# Patient Record
Sex: MALE | Race: WHITE | NOT HISPANIC OR LATINO | Employment: OTHER | URBAN - METROPOLITAN AREA
[De-identification: names, ages, dates, MRNs, and addresses within clinical notes are randomized per-mention and may not be internally consistent; named-entity substitution may affect disease eponyms.]

---

## 2017-07-11 RX ORDER — SIMVASTATIN 40 MG
TABLET ORAL
Qty: 90 TABLET | Refills: 1 | Status: SHIPPED | OUTPATIENT
Start: 2017-07-11 | End: 2017-11-13 | Stop reason: SDUPTHER

## 2017-07-11 RX ORDER — RAMIPRIL 5 MG/1
CAPSULE ORAL
Qty: 90 CAPSULE | Refills: 1 | Status: SHIPPED | OUTPATIENT
Start: 2017-07-11 | End: 2017-11-13 | Stop reason: SDUPTHER

## 2017-11-13 ENCOUNTER — OFFICE VISIT (OUTPATIENT)
Dept: CARDIOLOGY | Facility: CLINIC | Age: 82
End: 2017-11-13

## 2017-11-13 VITALS
HEIGHT: 69 IN | BODY MASS INDEX: 23.43 KG/M2 | SYSTOLIC BLOOD PRESSURE: 130 MMHG | DIASTOLIC BLOOD PRESSURE: 70 MMHG | WEIGHT: 158.2 LBS | HEART RATE: 60 BPM | OXYGEN SATURATION: 97 %

## 2017-11-13 DIAGNOSIS — I25.10 CORONARY ARTERY DISEASE INVOLVING NATIVE CORONARY ARTERY OF NATIVE HEART WITHOUT ANGINA PECTORIS: Primary | ICD-10-CM

## 2017-11-13 DIAGNOSIS — E78.2 MIXED HYPERLIPIDEMIA: ICD-10-CM

## 2017-11-13 DIAGNOSIS — I10 ESSENTIAL HYPERTENSION: ICD-10-CM

## 2017-11-13 PROCEDURE — 99213 OFFICE O/P EST LOW 20 MIN: CPT | Performed by: INTERNAL MEDICINE

## 2017-11-13 RX ORDER — TIMOLOL 5 MG/ML
SOLUTION/ DROPS OPHTHALMIC DAILY
Status: CANCELLED | OUTPATIENT
Start: 2017-11-13

## 2017-11-13 RX ORDER — RAMIPRIL 5 MG/1
5 CAPSULE ORAL DAILY
Qty: 90 CAPSULE | Refills: 4 | Status: SHIPPED | OUTPATIENT
Start: 2017-11-13 | End: 2018-11-19 | Stop reason: SDUPTHER

## 2017-11-13 RX ORDER — ASPIRIN 81 MG/1
81 TABLET ORAL DAILY
Qty: 90 TABLET | Refills: 3 | Status: CANCELLED | OUTPATIENT
Start: 2017-11-13

## 2017-11-13 RX ORDER — SIMVASTATIN 40 MG
40 TABLET ORAL NIGHTLY
Qty: 90 TABLET | Refills: 4 | Status: SHIPPED | OUTPATIENT
Start: 2017-11-13 | End: 2018-11-19 | Stop reason: SDUPTHER

## 2017-11-13 RX ORDER — HYDROCHLOROTHIAZIDE 25 MG/1
25 TABLET ORAL DAILY
Qty: 90 TABLET | Refills: 3 | Status: SHIPPED | OUTPATIENT
Start: 2017-11-13 | End: 2018-08-20 | Stop reason: SDUPTHER

## 2017-11-13 NOTE — PROGRESS NOTES
Subjective:     Encounter Date:11/13/2017      Patient ID: Konstantin Limon is a 82 y.o. male.    Chief Complaint: Coronary Artery Disease; Hypertension; and Hyperlipidemia    PROBLEM LIST:    1. Coronary artery disease:  a. Status post-acute coronary syndrome, 2006, with cardiac catheterization revealing 95% mid LAD, which  was stented with a 2.75 x 18 mm Taxus.  Also, showed 60% first diagonal.  EF 50% with apex being hypokinetic.  b. Myocardial Stress test 1/8/2015: Normal MPS  2. Hypertension.  3. Hyperlipidemia.  4. History of tobacco abuse.  5. Prostate cancer.  6. Glaucoma.  7. GERD/PUD.  8. History of CVA.  9. Surgical history:    a. TURP.  b. Mastoid surgery.  c. Appendectomy.  d. Recurrent basal cell carcinoma, status post radical  neck/ear surgery with skin flap, 2010.  e. Mohs procedure    History of Present Illness  Konstantin Limon returns today for a 12 month follow up with a history of coronary artery disease, hypertension and hyperlipidemia. Since last visit he has been doing well from a cardiovascular standpoint. Otherwise, he has had a recent surgery to remove cancer from his ear. Denies any exertional chest pain, shortness of breath, orthopnea, PND, or palpitations. To remain active, Mr. Limon does work around the house and yardwork. He also walks 2 miles per day.     Allergies   Allergen Reactions   • Niaspan [Niacin Er]      Flushing       Current Outpatient Prescriptions:   •  aspirin 81 MG EC tablet, Take 1 tablet by mouth Daily., Disp: 90 tablet, Rfl: 3  •  cetirizine (ZYRTEC ALLERGY) 10 MG tablet, Take 10 mg by mouth Daily., Disp: , Rfl:   •  cholecalciferol (VITAMIN D3) 1000 UNITS tablet, Take 1,000 Units by mouth Daily., Disp: , Rfl:   •  folic acid (FOLVITE) 400 MCG tablet, Take 400 mcg by mouth Daily., Disp: , Rfl:   •  hydrochlorothiazide (HYDRODIURIL) 25 MG tablet, Take 1 tablet by mouth Daily., Disp: 90 tablet, Rfl: 3  •  latanoprost (XALATAN) 0.005 % ophthalmic solution, 1  drop Every Night., Disp: , Rfl:   •  metoprolol tartrate (LOPRESSOR) 25 MG tablet, Take 0.5 tablets by mouth Every Night., Disp: 45 tablet, Rfl: 3  •  Multiple Vitamins-Minerals (MULTIVITAMIN ADULTS 50+ PO), Take  by mouth Daily., Disp: , Rfl:   •  omeprazole (priLOSEC) 20 MG capsule, Take 20 mg by mouth Daily., Disp: , Rfl:   •  ramipril (ALTACE) 5 MG capsule, TAKE 1 CAPSULE EVERY MORNING DAILY, Disp: 90 capsule, Rfl: 1  •  simvastatin (ZOCOR) 40 MG tablet, TAKE 1 TABLET EVERY DAY, Disp: 90 tablet, Rfl: 1  •  Timolol Maleate PF 0.5 % solution, Apply  to eye Daily., Disp: , Rfl:   •  vitamin B-12 (CYANOCOBALAMIN) 1000 MCG tablet, Take 1,000 mcg by mouth Daily., Disp: , Rfl:     The following portions of the patient's history were reviewed and updated as appropriate: allergies, current medications, past family history, past medical history, past social history, past surgical history and problem list.    Review of Systems   Constitution: Negative.   Cardiovascular: Negative.    Respiratory: Negative.    Hematologic/Lymphatic: Negative for bleeding problem. Does not bruise/bleed easily.   Skin: Negative for rash.   Musculoskeletal: Negative for muscle weakness and myalgias.   Gastrointestinal: Negative for heartburn, nausea and vomiting.   Neurological: Negative.         Objective:   There were no vitals filed for this visit.      Physical Exam   Constitutional: He is oriented to person, place, and time. He appears well-developed and well-nourished.   HENT:   Mouth/Throat: Oropharynx is clear and moist.   Neck: No JVD present. Carotid bruit is not present. No thyromegaly present.   Cardiovascular: Regular rhythm, S1 normal, S2 normal, normal heart sounds and intact distal pulses.  Exam reveals no gallop, no S3 and no S4.    No murmur heard.  Pulses:       Carotid pulses are 2+ on the right side, and 2+ on the left side.       Radial pulses are 2+ on the right side, and 2+ on the left side.   Pulmonary/Chest: Breath  sounds normal.   Abdominal: Soft. Bowel sounds are normal. He exhibits no mass. There is no tenderness.   Musculoskeletal: He exhibits no edema.   Neurological: He is alert and oriented to person, place, and time.   Skin: Skin is warm and dry. No rash noted.     Lab Review:  Labs 2017:   LDL 69  Creatinine 0.9    Procedures        Assessment:   Konstantin was seen today for coronary artery disease, hypertension and hyperlipidemia.    Diagnoses and all orders for this visit:    Coronary artery disease involving native coronary artery of native heart without angina pectoris    Essential hypertension    Mixed hyperlipidemia    Impression:  1. Coronary artery disease, stable no angina.  2. Hypertension controlled  3. Dyslipidemia well-controlled last LDL 69    Plan:  1. Continue current medications.  2. Revisit in 12 MO, or sooner as needed.    Scribed for Andrae King MD by Sis Wagner. 11/13/2017  9:23 AM    I, Andrae King have reviewed the note in full and agree with all aspects of the above including physical exam, assessment, labs and plan with changes made accordingly.     Andrae King MD  11/13/17  9:43 AM          Please note that portions of this note may have been completed with a voice recognition program. Efforts were made to edit the dictations, but occasionally words are mistranscribed.

## 2017-11-15 RX ORDER — OMEPRAZOLE 20 MG/1
20 CAPSULE, DELAYED RELEASE ORAL DAILY
Qty: 90 CAPSULE | Refills: 0 | Status: SHIPPED | OUTPATIENT
Start: 2017-11-15 | End: 2018-01-18 | Stop reason: SDUPTHER

## 2018-01-18 RX ORDER — OMEPRAZOLE 20 MG/1
CAPSULE, DELAYED RELEASE ORAL
Qty: 90 CAPSULE | Refills: 0 | Status: SHIPPED | OUTPATIENT
Start: 2018-01-18

## 2018-08-20 RX ORDER — HYDROCHLOROTHIAZIDE 25 MG/1
TABLET ORAL
Qty: 90 TABLET | Refills: 1 | Status: SHIPPED | OUTPATIENT
Start: 2018-08-20 | End: 2018-11-19 | Stop reason: SDUPTHER

## 2018-11-19 ENCOUNTER — OFFICE VISIT (OUTPATIENT)
Dept: CARDIOLOGY | Facility: CLINIC | Age: 83
End: 2018-11-19

## 2018-11-19 VITALS
HEART RATE: 59 BPM | BODY MASS INDEX: 23.28 KG/M2 | OXYGEN SATURATION: 96 % | HEIGHT: 69 IN | SYSTOLIC BLOOD PRESSURE: 130 MMHG | DIASTOLIC BLOOD PRESSURE: 74 MMHG | WEIGHT: 157.2 LBS

## 2018-11-19 DIAGNOSIS — I25.10 CORONARY ARTERY DISEASE INVOLVING NATIVE CORONARY ARTERY OF NATIVE HEART WITHOUT ANGINA PECTORIS: Primary | ICD-10-CM

## 2018-11-19 DIAGNOSIS — E78.2 MIXED HYPERLIPIDEMIA: ICD-10-CM

## 2018-11-19 DIAGNOSIS — I10 ESSENTIAL HYPERTENSION: ICD-10-CM

## 2018-11-19 PROCEDURE — 99213 OFFICE O/P EST LOW 20 MIN: CPT | Performed by: INTERNAL MEDICINE

## 2018-11-19 RX ORDER — RAMIPRIL 5 MG/1
5 CAPSULE ORAL DAILY
Qty: 90 CAPSULE | Refills: 4 | Status: SHIPPED | OUTPATIENT
Start: 2018-11-19 | End: 2020-01-06 | Stop reason: SDUPTHER

## 2018-11-19 RX ORDER — SIMVASTATIN 40 MG
40 TABLET ORAL NIGHTLY
Qty: 90 TABLET | Refills: 4 | Status: SHIPPED | OUTPATIENT
Start: 2018-11-19 | End: 2020-01-06 | Stop reason: SDUPTHER

## 2018-11-19 RX ORDER — HYDROCHLOROTHIAZIDE 25 MG/1
25 TABLET ORAL DAILY
Qty: 90 TABLET | Refills: 3 | Status: SHIPPED | OUTPATIENT
Start: 2018-11-19 | End: 2019-08-26 | Stop reason: SDUPTHER

## 2018-11-19 NOTE — PROGRESS NOTES
Subjective:     Encounter Date:11/19/2018      Patient ID: Konstantin Limon is a 83 y.o. male.    Chief Complaint: Coronary Artery Disease    PROBLEM LIST:  1. Coronary artery disease:  a. Status post-acute coronary syndrome, 2006, with cardiac catheterization revealing 95% mid LAD, which  was stented with a 2.75 x 18 mm Taxus.  Also, showed 60% first diagonal.  EF 50% with apex being hypokinetic.  b. Myocardial Stress test 1/8/2015: Normal MPS  2. Hypertension.  3. Hyperlipidemia.  4. History of tobacco abuse.  5. Prostate cancer.  6. Glaucoma.  7. GERD/PUD.  8. History of CVA.  9. Surgical history:    a. TURP.  b. Mastoid surgery.  c. Appendectomy.  d. Recurrent basal cell carcinoma, status post radical  neck/ear surgery with skin flap, 2010.  e. Mohs procedure    History of Present Illness  Konstantin Limon returns today for a 12 month follow up with a history of coronary artery disease, hypertension, and hyperlipidemia. Since last visit he has been doing well from a cardiovascular standpoint. [facility] presented with a few questions. Inquired whether aspirin and cholesterol should be taken during the day or at night. Denies any exertional chest pain, shortness of breath, orthopnea, PND, or palpitations. Mr. Limon remains active by walking and doing yardwork. Otherwise, he has been struggling with skin cancer.    Allergies   Allergen Reactions   • Niaspan [Niacin Er]      Flushing       Current Outpatient Medications:   •  aspirin 81 MG EC tablet, Take 1 tablet by mouth Daily., Disp: 90 tablet, Rfl: 3  •  B Complex Vitamins (B COMPLEX-B12 PO), Take 1 tablet by mouth Daily., Disp: , Rfl:   •  cetirizine (ZYRTEC ALLERGY) 10 MG tablet, Take 10 mg by mouth Daily., Disp: , Rfl:   •  cholecalciferol (VITAMIN D3) 1000 UNITS tablet, Take 1,000 Units by mouth Daily., Disp: , Rfl:   •  hydrochlorothiazide (HYDRODIURIL) 25 MG tablet, TAKE 1 TABLET EVERY DAY, Disp: 90 tablet, Rfl: 1  •  latanoprost (XALATAN)  "0.005 % ophthalmic solution, Administer 1 drop to both eyes Every Night., Disp: , Rfl:   •  Multiple Vitamins-Minerals (MULTIVITAMIN ADULTS 50+ PO), Take 1 tablet by mouth Daily., Disp: , Rfl:   •  omeprazole (priLOSEC) 20 MG capsule, TAKE 1 CAPSULE EVERY DAY, Disp: 90 capsule, Rfl: 0  •  ramipril (ALTACE) 5 MG capsule, Take 1 capsule by mouth Daily., Disp: 90 capsule, Rfl: 4  •  simvastatin (ZOCOR) 40 MG tablet, Take 1 tablet by mouth Every Night., Disp: 90 tablet, Rfl: 4  •  Timolol Maleate PF 0.5 % solution, Administer 1 drop to both eyes Daily., Disp: , Rfl:     The following portions of the patient's history were reviewed and updated as appropriate: allergies, current medications, past family history, past medical history, past social history, past surgical history and problem list.    Review of Systems   Constitution: Negative. Negative for weakness and malaise/fatigue.   Cardiovascular: Negative.  Negative for chest pain, leg swelling, near-syncope, orthopnea, palpitations, paroxysmal nocturnal dyspnea and syncope.   Respiratory: Negative.  Negative for cough, shortness of breath and wheezing.    Hematologic/Lymphatic: Negative for bleeding problem. Does not bruise/bleed easily.   Skin: Negative for rash.   Musculoskeletal: Negative for joint swelling, muscle weakness and myalgias.   Gastrointestinal: Negative for heartburn, nausea and vomiting.   Neurological: Negative.  Negative for headaches, light-headedness, loss of balance and tremors.   Psychiatric/Behavioral: The patient is not nervous/anxious.         Objective:     Vitals:    11/19/18 0913   BP: 130/74   BP Location: Left arm   Patient Position: Sitting   Pulse: 59   SpO2: 96%   Weight: 71.3 kg (157 lb 3.2 oz)   Height: 174 cm (68.5\")       Physical Exam   Constitutional: He is oriented to person, place, and time. He appears well-developed and well-nourished.   HENT:   Mouth/Throat: Oropharynx is clear and moist.   Neck: No JVD present. Carotid " bruit is not present. No thyromegaly present.   Cardiovascular: Regular rhythm, S1 normal, S2 normal, normal heart sounds and intact distal pulses. Exam reveals no gallop, no S3 and no S4.   No murmur heard.  Pulses:       Carotid pulses are 2+ on the right side, and 2+ on the left side.       Radial pulses are 2+ on the right side, and 2+ on the left side.   Pulmonary/Chest: Breath sounds normal.   Abdominal: Soft. Bowel sounds are normal. He exhibits no mass. There is no tenderness.   Musculoskeletal: He exhibits no edema.   Neurological: He is alert and oriented to person, place, and time.   Skin: Skin is warm and dry. No rash noted.     Lab Review:    September 5, 2018  LDL 71  HDL Cholesterol 26   A1c 6  Creatinine 0.84    Procedures        Assessment:   Konstantin was seen today for coronary artery disease.    Diagnoses and all orders for this visit:    Coronary artery disease involving native coronary artery of native heart without angina pectoris    Essential hypertension    Mixed hyperlipidemia    Impression:  1. Coronary artery disease, stable without angina.  2. Hypertension is well-controlled.  3. Hyperlipidemia is controlled with statin therapy.    Plan:  1. Continue current medications.  2. Revisit in 12 MO, or sooner as needed.    Scribed for Andrae King MD by Sis Wagner. 11/19/2018  9:47 AM    I, Andrae King MD, personally performed the services described in this documentation as scribed by the above individual in my presence, and it is both accurate and complete      Please note that portions of this note may have been completed with a voice recognition program. Efforts were made to edit the dictations, but occasionally words are mistranscribed.

## 2019-08-26 RX ORDER — HYDROCHLOROTHIAZIDE 25 MG/1
TABLET ORAL
Qty: 90 TABLET | Refills: 3 | Status: SHIPPED | OUTPATIENT
Start: 2019-08-26 | End: 2020-01-06 | Stop reason: SDUPTHER

## 2020-01-06 ENCOUNTER — OFFICE VISIT (OUTPATIENT)
Dept: CARDIOLOGY | Facility: CLINIC | Age: 85
End: 2020-01-06

## 2020-01-06 VITALS
HEART RATE: 58 BPM | BODY MASS INDEX: 23.25 KG/M2 | WEIGHT: 157 LBS | OXYGEN SATURATION: 97 % | SYSTOLIC BLOOD PRESSURE: 132 MMHG | HEIGHT: 69 IN | DIASTOLIC BLOOD PRESSURE: 76 MMHG

## 2020-01-06 DIAGNOSIS — I10 ESSENTIAL HYPERTENSION: ICD-10-CM

## 2020-01-06 DIAGNOSIS — E78.2 MIXED HYPERLIPIDEMIA: ICD-10-CM

## 2020-01-06 DIAGNOSIS — I25.10 CORONARY ARTERY DISEASE INVOLVING NATIVE CORONARY ARTERY OF NATIVE HEART WITHOUT ANGINA PECTORIS: Primary | ICD-10-CM

## 2020-01-06 PROCEDURE — 99213 OFFICE O/P EST LOW 20 MIN: CPT | Performed by: INTERNAL MEDICINE

## 2020-01-06 RX ORDER — SIMVASTATIN 40 MG
40 TABLET ORAL NIGHTLY
Qty: 90 TABLET | Refills: 4 | Status: SHIPPED | OUTPATIENT
Start: 2020-01-06 | End: 2021-01-11 | Stop reason: SDUPTHER

## 2020-01-06 RX ORDER — HYDROCHLOROTHIAZIDE 25 MG/1
25 TABLET ORAL DAILY
Qty: 90 TABLET | Refills: 3 | Status: SHIPPED | OUTPATIENT
Start: 2020-01-06 | End: 2021-01-11 | Stop reason: SDUPTHER

## 2020-01-06 RX ORDER — RAMIPRIL 5 MG/1
5 CAPSULE ORAL DAILY
Qty: 90 CAPSULE | Refills: 4 | Status: SHIPPED | OUTPATIENT
Start: 2020-01-06 | End: 2021-01-11 | Stop reason: SDUPTHER

## 2020-01-06 NOTE — PROGRESS NOTES
Subjective:     Encounter Date:01/06/2020      Patient ID: Konstantin Limon is a 84 y.o. male.    Chief Complaint: Coronary Artery Disease      PROBLEM LIST:    1. Coronary artery disease:  a. Status post-acute coronary syndrome, 2006, with cardiac catheterization revealing 95% mid LAD, which  was stented with a 2.75 x 18 mm Taxus.  Also, showed 60% first diagonal.  EF 50% with apex being hypokinetic.  b. Myocardial Stress test 1/8/2015: Normal MPS  2. Hypertension.  3. Hyperlipidemia.  4. History of tobacco abuse.  5. Prostate cancer.  6. Glaucoma.  7. GERD/PUD.  8. History of CVA.  9. Surgical history:    a. TURP.  b. Mastoid surgery.  c. Appendectomy.  d. Recurrent basal cell carcinoma, status post radical  neck/ear surgery with skin flap, 2010.  e. Mohs procedure    History of Present Illness  Konstantin Limon returns today for an annual follow up with a history of coronary artery disease, hypertension, and hyperlipidemia. Since last visit he was diagnosed with skin cancer and had to receive fifteen radiation treatments. He is otherwise doing well from a cardiac standpoint. He remains active by walking about three miles everyday. Denies any exertional chest pain, shortness of breath, orthopnea, PND, or palpitations.    Allergies   Allergen Reactions   • Niaspan [Niacin Er]      Flushing         Current Outpatient Medications:   •  aspirin 81 MG EC tablet, Take 1 tablet by mouth Daily., Disp: 90 tablet, Rfl: 3  •  B Complex Vitamins (B COMPLEX-B12 PO), Take 1 tablet by mouth Daily., Disp: , Rfl:   •  cetirizine (ZYRTEC ALLERGY) 10 MG tablet, Take 10 mg by mouth Daily., Disp: , Rfl:   •  cholecalciferol (VITAMIN D3) 1000 UNITS tablet, Take 1,000 Units by mouth Daily., Disp: , Rfl:   •  hydrochlorothiazide (HYDRODIURIL) 25 MG tablet, TAKE 1 TABLET EVERY DAY, Disp: 90 tablet, Rfl: 3  •  latanoprost (XALATAN) 0.005 % ophthalmic solution, Administer 1 drop to both eyes Every Night., Disp: , Rfl:   •  Multiple  "Vitamins-Minerals (MULTIVITAMIN ADULTS 50+ PO), Take 1 tablet by mouth Daily., Disp: , Rfl:   •  omeprazole (priLOSEC) 20 MG capsule, TAKE 1 CAPSULE EVERY DAY, Disp: 90 capsule, Rfl: 0  •  ramipril (ALTACE) 5 MG capsule, Take 1 capsule by mouth Daily., Disp: 90 capsule, Rfl: 4  •  simvastatin (ZOCOR) 40 MG tablet, Take 1 tablet by mouth Every Night., Disp: 90 tablet, Rfl: 4  •  Timolol Maleate PF 0.5 % solution, Administer 1 drop to both eyes Daily., Disp: , Rfl:     The following portions of the patient's history were reviewed and updated as appropriate: allergies, current medications, past family history, past medical history, past social history, past surgical history and problem list.    Review of Systems   Constitution: Negative.   Cardiovascular: Negative.  Negative for chest pain, cyanosis, dyspnea on exertion, irregular heartbeat, leg swelling, orthopnea, palpitations and paroxysmal nocturnal dyspnea.   Respiratory: Negative.  Negative for shortness of breath.    Hematologic/Lymphatic: Negative for bleeding problem. Does not bruise/bleed easily.   Skin: Positive for skin cancer. Negative for rash.   Musculoskeletal: Negative for muscle weakness and myalgias.   Gastrointestinal: Negative for heartburn, nausea and vomiting.   Neurological: Negative.           Objective:     Vitals:    01/06/20 1001   BP: 132/76   BP Location: Right arm   Patient Position: Sitting   Pulse: 58   SpO2: 97%   Weight: 71.2 kg (157 lb)   Height: 175.3 cm (69\")         Physical Exam   Constitutional: He is oriented to person, place, and time. He appears well-developed and well-nourished.   HENT:   Mouth/Throat: Oropharynx is clear and moist.   Neck: No JVD present. Carotid bruit is not present. No thyromegaly present.   Cardiovascular: Regular rhythm, S1 normal, S2 normal, normal heart sounds and intact distal pulses. Exam reveals no gallop, no S3 and no S4.   No murmur heard.  Pulses:       Carotid pulses are 2+ on the right side, " and 2+ on the left side.       Radial pulses are 2+ on the right side, and 2+ on the left side.   Pulmonary/Chest: Breath sounds normal.   Abdominal: Soft. Bowel sounds are normal. He exhibits no mass. There is no tenderness.   Musculoskeletal: He exhibits no edema.   Neurological: He is alert and oriented to person, place, and time.   Skin: Skin is warm and dry. No rash noted.       Lab Review:    Last lipid Panel:  08/28/2019      TRIG 141  HDL 33  LDL 79    Procedures        Assessment:   Konstantin was seen today for coronary artery disease.    Diagnoses and all orders for this visit:    Coronary artery disease involving native coronary artery of native heart without angina pectoris    Essential hypertension    Mixed hyperlipidemia        Impression:  1. Coronary artery disease, stable and without angina. Pt is on aspirin 81 mg for anticoagulation.   2. Hypertension, well controlled on ramipril 5 mg.   3. Hyperlipidemia, well controlled on simvastatin 40 mg.     Plan:  1. Stable cardiac status.   2. Continue current medications.  3. Revisit in 12 MO, or sooner as needed.    Scribed for Andrae King MD by Farhana Brumfield. 1/6/2020  10:28 AM    Andrae King MD      Please note that portions of this note may have been completed with a voice recognition program. Efforts were made to edit the dictations, but occasionally words are mistranscribed.

## 2021-01-11 ENCOUNTER — OFFICE VISIT (OUTPATIENT)
Dept: CARDIOLOGY | Facility: CLINIC | Age: 86
End: 2021-01-11

## 2021-01-11 VITALS
OXYGEN SATURATION: 96 % | BODY MASS INDEX: 24.4 KG/M2 | HEART RATE: 58 BPM | SYSTOLIC BLOOD PRESSURE: 132 MMHG | HEIGHT: 68 IN | RESPIRATION RATE: 20 BRPM | DIASTOLIC BLOOD PRESSURE: 66 MMHG | WEIGHT: 161 LBS

## 2021-01-11 DIAGNOSIS — E78.2 MIXED HYPERLIPIDEMIA: ICD-10-CM

## 2021-01-11 DIAGNOSIS — I10 ESSENTIAL HYPERTENSION: ICD-10-CM

## 2021-01-11 DIAGNOSIS — I25.10 CORONARY ARTERY DISEASE INVOLVING NATIVE CORONARY ARTERY OF NATIVE HEART WITHOUT ANGINA PECTORIS: Primary | ICD-10-CM

## 2021-01-11 PROCEDURE — 99214 OFFICE O/P EST MOD 30 MIN: CPT | Performed by: INTERNAL MEDICINE

## 2021-01-11 RX ORDER — HYDROCHLOROTHIAZIDE 25 MG/1
25 TABLET ORAL DAILY
Qty: 90 TABLET | Refills: 3 | Status: SHIPPED | OUTPATIENT
Start: 2021-01-11 | End: 2022-01-17 | Stop reason: SDUPTHER

## 2021-01-11 RX ORDER — RAMIPRIL 5 MG/1
5 CAPSULE ORAL DAILY
Qty: 90 CAPSULE | Refills: 4 | Status: SHIPPED | OUTPATIENT
Start: 2021-01-11 | End: 2022-01-17 | Stop reason: SDUPTHER

## 2021-01-11 RX ORDER — SIMVASTATIN 40 MG
40 TABLET ORAL NIGHTLY
Qty: 90 TABLET | Refills: 4 | Status: SHIPPED | OUTPATIENT
Start: 2021-01-11 | End: 2022-01-17 | Stop reason: SDUPTHER

## 2021-01-11 NOTE — PROGRESS NOTES
[facility] Cardiology  Subjective:     Encounter Date: 01/11/2021      Patient ID: Konstantin Limon is a 85 y.o. male.    Chief Complaint: Coronary Artery Disease      PROBLEM LIST:  1. Coronary artery disease:  a. Status post-acute coronary syndrome, 2006, with cardiac catheterization revealing 95% mid LAD, which  was stented with a 2.75 x 18 mm Taxus.  Also, showed 60% first diagonal.  EF 50% with apex being hypokinetic.  b. Myocardial Stress test 1/8/2015: Normal MPS  2. Hypertension.  3. Hyperlipidemia.  4. History of tobacco abuse.  5. Prostate cancer.  6. Recurrence head neck squamous cell CA  a. Status post radical neck/ear surgery with skin flap 2020  7. Glaucoma.  8. GERD/PUD.  9. History of CVA.  10. Surgical history:    a. TURP.  b. Mastoid surgery.  c. Appendectomy.  d. Recurrent basal cell carcinoma, status post radical  neck/ear surgery with skin flap, 2010.  e. Mohs procedure      History of Present Illness  Konstantin Limon returns today for 12-month follow up with a history of coronary artery disease and cardiac risk factors. Since last visit, he has been feeling well overall from a cardiovascular standpoint. He has not had any cardiopulmonary limitations with activity and his wife notes she even has to stop him from going outside to walk too frequently. Patient denies chest pain, shortness of breath, palpitations, edema, dizziness, and syncope. Patient had left facial surgery for squamous cell cancer in August 2020. He did not have radiation therapy following the surgery. He had a CT scan last week which showed he is in remission.     Allergies   Allergen Reactions   • Niaspan [Niacin Er]      Flushing         Current Outpatient Medications:   •  aspirin 81 MG EC tablet, Take 1 tablet by mouth Daily., Disp: 90 tablet, Rfl: 3  •  B Complex Vitamins (B COMPLEX-B12 PO), Take 1 tablet by mouth Daily., Disp: , Rfl:   •  cetirizine (ZYRTEC ALLERGY) 10 MG tablet, Take 10 mg by  "mouth Daily., Disp: , Rfl:   •  cholecalciferol (VITAMIN D3) 1000 UNITS tablet, Take 1,000 Units by mouth Daily., Disp: , Rfl:   •  hydroCHLOROthiazide (HYDRODIURIL) 25 MG tablet, Take 1 tablet by mouth Daily., Disp: 90 tablet, Rfl: 3  •  latanoprost (XALATAN) 0.005 % ophthalmic solution, Administer 1 drop to both eyes Every Night., Disp: , Rfl:   •  Multiple Vitamins-Minerals (MULTIVITAMIN ADULTS 50+ PO), Take 1 tablet by mouth Daily., Disp: , Rfl:   •  omeprazole (priLOSEC) 20 MG capsule, TAKE 1 CAPSULE EVERY DAY, Disp: 90 capsule, Rfl: 0  •  ramipril (ALTACE) 5 MG capsule, Take 1 capsule by mouth Daily., Disp: 90 capsule, Rfl: 4  •  simvastatin (ZOCOR) 40 MG tablet, Take 1 tablet by mouth Every Night., Disp: 90 tablet, Rfl: 4  •  Timolol Maleate PF 0.5 % solution, Administer 1 drop to both eyes Daily., Disp: , Rfl:     The following portions of the patient's history were reviewed and updated as appropriate: allergies, current medications, past family history, past medical history, past social history, past surgical history and problem list.    Review of Systems   Constitution: Negative.   Cardiovascular: Negative for chest pain, dyspnea on exertion, leg swelling, palpitations and syncope.   Respiratory: Negative.  Negative for shortness of breath.    Hematologic/Lymphatic: Negative for bleeding problem. Does not bruise/bleed easily.   Skin: Negative for rash.   Musculoskeletal: Negative for muscle weakness and myalgias.   Gastrointestinal: Negative for heartburn, nausea and vomiting.   Neurological: Negative for dizziness, light-headedness, loss of balance and numbness.          Objective:     Vitals:    01/11/21 0946   BP: 132/66   BP Location: Right arm   Pulse: 58   Resp: 20   SpO2: 96%   Weight: 73 kg (161 lb)   Height: 172.7 cm (68\")         Vitals signs reviewed.   Constitutional:       Appearance: Well-developed.   Neck:      Thyroid: No thyromegaly.      Vascular: No carotid bruit or JVD.      " Lymphadenopathy: No cervical adenopathy.   Pulmonary:      Effort: Pulmonary effort is normal.      Breath sounds: Normal breath sounds.   Cardiovascular:      Regular rhythm.      No gallop. No S3 gallop.   Pulses:     Dorsalis pedis: 2+ bilaterally.     Posterior tibial: 2+ bilaterally.  Abdominal:      Palpations: Abdomen is soft. There is no abdominal mass.      Tenderness: There is no abdominal tenderness. There is no guarding.   Musculoskeletal: Normal range of motion.   Skin:     General: Skin is warm and dry.      Findings: No rash.   Neurological:      Mental Status: Alert and oriented to person, place, and time.         Lab Review:    Last lipid Panel, 08/28/2019:    TRIG 141  HDL 33  LDL 79    Procedures        Assessment:   Diagnoses and all orders for this visit:    1. Coronary artery disease involving native coronary artery of native heart without angina pectoris (Primary)    2. Essential hypertension    3. Mixed hyperlipidemia    Other orders  -     hydroCHLOROthiazide (HYDRODIURIL) 25 MG tablet; Take 1 tablet by mouth Daily.  Dispense: 90 tablet; Refill: 3  -     ramipril (ALTACE) 5 MG capsule; Take 1 capsule by mouth Daily.  Dispense: 90 capsule; Refill: 4  -     simvastatin (ZOCOR) 40 MG tablet; Take 1 tablet by mouth Every Night.  Dispense: 90 tablet; Refill: 4        Impression:  1. Coronary artery disease, stable without angina on aspirin 81 mg daily.  2. Hypertension, well controlled on ramipril and HCTZ.  3. Hyperlipidemia, monitored by PCP, on simvastatin   4. Recurrent head neck squamous CA      Plan:  1. Stable cardiac status.  2. Continue current medications.  3. Revisit in 12 MO, or sooner as needed.    Scribed for Andrae King MD by Tomer Bingham. 1/11/2021  10:21 EST       Andrae King MD      Please note that portions of this note may have been completed with a voice recognition program. Efforts were made to edit the dictations, but occasionally words are  mistranscribed.

## 2021-05-06 ENCOUNTER — TELEPHONE (OUTPATIENT)
Dept: CARDIOLOGY | Facility: CLINIC | Age: 86
End: 2021-05-06

## 2021-05-06 NOTE — TELEPHONE ENCOUNTER
Wife called to report  is having issues with his blood pressure and feet and ankles swelling. They would like to come in and see cardiology sooner.

## 2021-05-09 NOTE — PROGRESS NOTES
[facility] Cardiology  Subjective:     Encounter Date: 05/10/2021      Patient ID: Konstantin Limon is a 85 y.o. male.    Chief Complaint: Coronary Artery Disease, Leg Swelling, Leg Pain, Fatigue, and Hypertension      PROBLEM LIST:  1. Coronary artery disease:  a. Status post-acute coronary syndrome, 2006, with cardiac catheterization revealing 95% mid LAD, which  was stented with a 2.75 x 18 mm Taxus.  Also, showed 60% first diagonal.  EF 50% with apex being hypokinetic.  b. Myocardial Stress test 1/8/2015: Normal MPS  2. Hypertension.  3. Hyperlipidemia.  4. History of tobacco abuse.  5. Prostate cancer.  6. Recurrence head neck squamous cell CA  a. Status post radical neck/ear surgery with skin flap 2020  7. Glaucoma.  8. GERD/PUD.  9. History of CVA.  10. Surgical history:    a. TURP.  b. Mastoid surgery.  c. Appendectomy.  d. Recurrent basal cell carcinoma, status post radical  neck/ear surgery with skin flap, 2010.  e. Mohs procedure      History of Present Illness  Konstantin Limon returns today for a follow up with a history of coronary artery disease and cardiac risk factors. Since last visit, his blood pressure has been elevated. His wife monitors it and during April it ranged from 188/84 to 161/92, but most recently 138/92 to 144/74. She also mentions once when he mowed their lawn it was very high afterwards. For past 2-3 days he's experienced fatigue and lower extremity edema with red blotches. He and his wife went to a clinic where they prescribed him prednisone which helped to decrease fluid retention. He just finished his last dose today. He sleeps more comfortably sitting up so his wife has been trying to elevate his legs at night and has also soaked them in water with epsom salt. He has received both of his coronavirus immunizations. He may have an upcoming eye surgery. Patient denies chest pain, shortness of breath, palpitations, dizziness, and syncope.       Allergies    Allergen Reactions   • Niaspan [Niacin Er]      Flushing         Current Outpatient Medications:   •  aspirin 81 MG EC tablet, Take 1 tablet by mouth Daily., Disp: 90 tablet, Rfl: 3  •  B Complex Vitamins (B COMPLEX-B12 PO), Take 1 tablet by mouth Daily., Disp: , Rfl:   •  cetirizine (ZYRTEC ALLERGY) 10 MG tablet, Take 10 mg by mouth Daily., Disp: , Rfl:   •  cholecalciferol (VITAMIN D3) 1000 UNITS tablet, Take 1,000 Units by mouth Daily., Disp: , Rfl:   •  erythromycin (ROMYCIN) 5 MG/GM ophthalmic ointment, Administer  into the left eye As Needed., Disp: , Rfl:   •  gabapentin (NEURONTIN) 300 MG capsule, Take 1 capsule by mouth 3 (Three) Times a Day As Needed., Disp: , Rfl:   •  hydroCHLOROthiazide (HYDRODIURIL) 25 MG tablet, Take 1 tablet by mouth Daily., Disp: 90 tablet, Rfl: 3  •  latanoprost (XALATAN) 0.005 % ophthalmic solution, Administer 1 drop to both eyes Every Night., Disp: , Rfl:   •  Multiple Vitamins-Minerals (MULTIVITAMIN ADULTS 50+ PO), Take 1 tablet by mouth Daily., Disp: , Rfl:   •  omeprazole (priLOSEC) 20 MG capsule, TAKE 1 CAPSULE EVERY DAY, Disp: 90 capsule, Rfl: 0  •  ramipril (ALTACE) 5 MG capsule, Take 1 capsule by mouth Daily., Disp: 90 capsule, Rfl: 4  •  simvastatin (ZOCOR) 40 MG tablet, Take 1 tablet by mouth Every Night., Disp: 90 tablet, Rfl: 4  •  Timolol Maleate PF 0.5 % solution, Administer 1 drop to both eyes Daily., Disp: , Rfl:     The following portions of the patient's history were reviewed and updated as appropriate: allergies, current medications, past family history, past medical history, past social history, past surgical history and problem list.    Review of Systems   Constitutional: Positive for malaise/fatigue.   Cardiovascular: Positive for leg swelling. Negative for chest pain, dyspnea on exertion, palpitations and syncope.   Respiratory: Negative.  Negative for shortness of breath.    Hematologic/Lymphatic: Negative for bleeding problem. Does not bruise/bleed  "easily.   Skin: Negative for rash.   Musculoskeletal: Negative for muscle weakness and myalgias.   Gastrointestinal: Negative for heartburn, nausea and vomiting.   Neurological: Negative for dizziness, light-headedness, loss of balance and numbness.          Objective:     Vitals:    05/10/21 1303   BP: 164/82   BP Location: Right arm   Patient Position: Sitting   Pulse: 73   SpO2: 96%   Weight: 73 kg (161 lb)   Height: 172.7 cm (68\")         Constitutional:       Appearance: Well-developed.   Neck:      Thyroid: No thyromegaly.      Vascular: No carotid bruit or JVD.   Pulmonary:      Breath sounds: Normal breath sounds.   Cardiovascular:      Regular rhythm.      No gallop. No S3 and S4 gallop.   Edema:     Peripheral edema present.     Ankle: bilateral trace edema of the ankle.     Feet: bilateral trace edema of the feet.  Abdominal:      General: Bowel sounds are normal.      Palpations: Abdomen is soft. There is no abdominal mass.      Tenderness: There is no abdominal tenderness.   Skin:     General: Skin is warm and dry.      Findings: No rash.   Neurological:      Mental Status: Alert and oriented to person, place, and time.         Lab Review:    Lab date: 9/18/2020  • FLP: , , HDL 35, LDL 80  • CMP: Glu 97, BUN 7, Creat 0.82, eGFR >60, Na 137, K 3.9, Cl 98, CO2 31, Ca 9.5, Alk Phos 74, AST 28, ALT 30  • CBC: WBC 5.7, RBC 4.73, HGB 14.4, HCT 42.4, MCV 89.6, MCH 30.4,   • HbA1c: 5.8  • TSH: 2.83       Procedures        Assessment:   Diagnoses and all orders for this visit:    1. Coronary artery disease involving native coronary artery of native heart without angina pectoris (Primary)    2. Essential hypertension    3. Mixed hyperlipidemia        Impression:  1. Coronary artery disease.  No current angina but functional class II-3 due to general fatigue.  On aspirin.   2. Hypertension. Poor control on ramipril and HCTZ.   3. Hyperlipidemia. Well controlled. On simvastatin  4. New onset " lower extremity edema.  Improving with prednisone.    Plan:  1. Begin metoprolol succinate 25 mg daily due poor control of blood pressure.  2. Echocardiogram and myocardial perfusion study to be scheduled.  Given new symptoms  3. Continue current medications.  4. Revisit after echocardiogram and perfusion study, or sooner as needed.    Scribed for Andrae King MD by Giulia Montana. 5/10/2021 13:25 EDT      Andrae King MD      Please note that portions of this note may have been completed with a voice recognition program. Efforts were made to edit the dictations, but occasionally words are mistranscribed.

## 2021-05-10 ENCOUNTER — OFFICE VISIT (OUTPATIENT)
Dept: CARDIOLOGY | Facility: CLINIC | Age: 86
End: 2021-05-10

## 2021-05-10 VITALS
HEART RATE: 73 BPM | DIASTOLIC BLOOD PRESSURE: 82 MMHG | SYSTOLIC BLOOD PRESSURE: 164 MMHG | HEIGHT: 68 IN | WEIGHT: 161 LBS | OXYGEN SATURATION: 96 % | BODY MASS INDEX: 24.4 KG/M2

## 2021-05-10 DIAGNOSIS — R00.1 BRADYCARDIA: ICD-10-CM

## 2021-05-10 DIAGNOSIS — I10 ESSENTIAL HYPERTENSION: ICD-10-CM

## 2021-05-10 DIAGNOSIS — E78.2 MIXED HYPERLIPIDEMIA: ICD-10-CM

## 2021-05-10 DIAGNOSIS — I25.10 CORONARY ARTERY DISEASE INVOLVING NATIVE CORONARY ARTERY OF NATIVE HEART, ANGINA PRESENCE UNSPECIFIED: Primary | ICD-10-CM

## 2021-05-10 DIAGNOSIS — I25.10 CORONARY ARTERY DISEASE INVOLVING NATIVE CORONARY ARTERY OF NATIVE HEART WITHOUT ANGINA PECTORIS: Primary | ICD-10-CM

## 2021-05-10 PROCEDURE — 99214 OFFICE O/P EST MOD 30 MIN: CPT | Performed by: INTERNAL MEDICINE

## 2021-05-10 RX ORDER — METOPROLOL SUCCINATE 25 MG/1
25 TABLET, EXTENDED RELEASE ORAL DAILY
Qty: 30 TABLET | Refills: 11 | Status: SHIPPED | OUTPATIENT
Start: 2021-05-10 | End: 2021-05-27 | Stop reason: SDUPTHER

## 2021-05-10 RX ORDER — GABAPENTIN 300 MG/1
1 CAPSULE ORAL 3 TIMES DAILY PRN
COMMUNITY
Start: 2021-05-05

## 2021-05-10 RX ORDER — ERYTHROMYCIN 5 MG/G
OINTMENT OPHTHALMIC AS NEEDED
COMMUNITY
Start: 2021-04-27

## 2021-05-28 RX ORDER — METOPROLOL SUCCINATE 25 MG/1
25 TABLET, EXTENDED RELEASE ORAL DAILY
Qty: 90 TABLET | Refills: 3 | Status: SHIPPED | OUTPATIENT
Start: 2021-05-28 | End: 2022-01-17 | Stop reason: SDUPTHER

## 2021-06-22 ENCOUNTER — HOSPITAL ENCOUNTER (OUTPATIENT)
Dept: CARDIOLOGY | Facility: HOSPITAL | Age: 86
Discharge: HOME | End: 2021-06-22

## 2021-06-22 ENCOUNTER — HOSPITAL ENCOUNTER (OUTPATIENT)
Dept: CARDIOLOGY | Facility: HOSPITAL | Age: 86
Discharge: HOME | End: 2021-06-22
Admitting: INTERNAL MEDICINE

## 2021-06-22 VITALS — BODY MASS INDEX: 24.4 KG/M2 | HEIGHT: 68 IN | WEIGHT: 161 LBS

## 2021-06-22 DIAGNOSIS — I10 ESSENTIAL HYPERTENSION: ICD-10-CM

## 2021-06-22 DIAGNOSIS — R00.1 BRADYCARDIA: ICD-10-CM

## 2021-06-22 DIAGNOSIS — I25.10 CORONARY ARTERY DISEASE INVOLVING NATIVE CORONARY ARTERY OF NATIVE HEART, ANGINA PRESENCE UNSPECIFIED: ICD-10-CM

## 2021-06-22 LAB
BH CV ECHO MEAS - AO MAX PG (FULL): 2.2 MMHG
BH CV ECHO MEAS - AO MAX PG: 10 MMHG
BH CV ECHO MEAS - AO MEAN PG (FULL): 2 MMHG
BH CV ECHO MEAS - AO MEAN PG: 6 MMHG
BH CV ECHO MEAS - AO ROOT AREA (BSA CORRECTED): 1.7
BH CV ECHO MEAS - AO ROOT AREA: 7.5 CM^2
BH CV ECHO MEAS - AO ROOT DIAM: 3.1 CM
BH CV ECHO MEAS - AO V2 MAX: 160 CM/SEC
BH CV ECHO MEAS - AO V2 MEAN: 119 CM/SEC
BH CV ECHO MEAS - AO V2 VTI: 40.1 CM
BH CV ECHO MEAS - ASC AORTA: 2.9 CM
BH CV ECHO MEAS - AVA(I,A): 2.5 CM^2
BH CV ECHO MEAS - AVA(I,D): 2.5 CM^2
BH CV ECHO MEAS - AVA(V,A): 2.7 CM^2
BH CV ECHO MEAS - AVA(V,D): 2.7 CM^2
BH CV ECHO MEAS - BSA(HAYCOCK): 1.9 M^2
BH CV ECHO MEAS - BSA: 1.9 M^2
BH CV ECHO MEAS - BZI_BMI: 24.5 KILOGRAMS/M^2
BH CV ECHO MEAS - BZI_METRIC_HEIGHT: 172.7 CM
BH CV ECHO MEAS - BZI_METRIC_WEIGHT: 73 KG
BH CV ECHO MEAS - EDV(CUBED): 110.6 ML
BH CV ECHO MEAS - EDV(MOD-SP2): 62.4 ML
BH CV ECHO MEAS - EDV(MOD-SP4): 101 ML
BH CV ECHO MEAS - EDV(TEICH): 107.5 ML
BH CV ECHO MEAS - EF(CUBED): 80.2 %
BH CV ECHO MEAS - EF(MOD-BP): 65.8 %
BH CV ECHO MEAS - EF(MOD-SP2): 65.9 %
BH CV ECHO MEAS - EF(MOD-SP4): 67.2 %
BH CV ECHO MEAS - EF(TEICH): 72.5 %
BH CV ECHO MEAS - ESV(CUBED): 22 ML
BH CV ECHO MEAS - ESV(MOD-SP2): 21.3 ML
BH CV ECHO MEAS - ESV(MOD-SP4): 33.1 ML
BH CV ECHO MEAS - ESV(TEICH): 29.6 ML
BH CV ECHO MEAS - FS: 41.7 %
BH CV ECHO MEAS - IVS/LVPW: 0.8
BH CV ECHO MEAS - IVSD: 0.8 CM
BH CV ECHO MEAS - LA DIMENSION: 3.6 CM
BH CV ECHO MEAS - LA/AO: 1.2
BH CV ECHO MEAS - LAD MAJOR: 4.7 CM
BH CV ECHO MEAS - LV DIASTOLIC VOL/BSA (35-75): 54.2 ML/M^2
BH CV ECHO MEAS - LV IVRT: 0.1 SEC
BH CV ECHO MEAS - LV MASS(C)D: 147.8 GRAMS
BH CV ECHO MEAS - LV MASS(C)DI: 79.3 GRAMS/M^2
BH CV ECHO MEAS - LV MAX PG: 7.8 MMHG
BH CV ECHO MEAS - LV MEAN PG: 4 MMHG
BH CV ECHO MEAS - LV SYSTOLIC VOL/BSA (12-30): 17.8 ML/M^2
BH CV ECHO MEAS - LV V1 MAX: 140 CM/SEC
BH CV ECHO MEAS - LV V1 MEAN: 92 CM/SEC
BH CV ECHO MEAS - LV V1 VTI: 31.6 CM
BH CV ECHO MEAS - LVIDD: 4.8 CM
BH CV ECHO MEAS - LVIDS: 2.8 CM
BH CV ECHO MEAS - LVLD AP2: 8.2 CM
BH CV ECHO MEAS - LVLD AP4: 8.6 CM
BH CV ECHO MEAS - LVLS AP2: 6.3 CM
BH CV ECHO MEAS - LVLS AP4: 6.9 CM
BH CV ECHO MEAS - LVOT AREA (M): 3.1 CM^2
BH CV ECHO MEAS - LVOT AREA: 3.1 CM^2
BH CV ECHO MEAS - LVOT DIAM: 2 CM
BH CV ECHO MEAS - LVPWD: 1 CM
BH CV ECHO MEAS - MV A MAX VEL: 131 CM/SEC
BH CV ECHO MEAS - MV DEC SLOPE: 514 CM/SEC^2
BH CV ECHO MEAS - MV DEC TIME: 0.28 SEC
BH CV ECHO MEAS - MV E MAX VEL: 95.5 CM/SEC
BH CV ECHO MEAS - MV E/A: 0.73
BH CV ECHO MEAS - MV P1/2T MAX VEL: 115 CM/SEC
BH CV ECHO MEAS - MV P1/2T: 65.5 MSEC
BH CV ECHO MEAS - MVA P1/2T LCG: 1.9 CM^2
BH CV ECHO MEAS - MVA(P1/2T): 3.4 CM^2
BH CV ECHO MEAS - PA ACC TIME: 0.13 SEC
BH CV ECHO MEAS - PA MAX PG: 5.3 MMHG
BH CV ECHO MEAS - PA PR(ACCEL): 20.5 MMHG
BH CV ECHO MEAS - PA V2 MAX: 115 CM/SEC
BH CV ECHO MEAS - RAP SYSTOLE: 3 MMHG
BH CV ECHO MEAS - RVSP: 35 MMHG
BH CV ECHO MEAS - SI(AO): 162.4 ML/M^2
BH CV ECHO MEAS - SI(CUBED): 47.6 ML/M^2
BH CV ECHO MEAS - SI(LVOT): 53.3 ML/M^2
BH CV ECHO MEAS - SI(MOD-SP2): 22.1 ML/M^2
BH CV ECHO MEAS - SI(MOD-SP4): 36.4 ML/M^2
BH CV ECHO MEAS - SI(TEICH): 41.8 ML/M^2
BH CV ECHO MEAS - SV(AO): 302.7 ML
BH CV ECHO MEAS - SV(CUBED): 88.6 ML
BH CV ECHO MEAS - SV(LVOT): 99.3 ML
BH CV ECHO MEAS - SV(MOD-SP2): 41.1 ML
BH CV ECHO MEAS - SV(MOD-SP4): 67.9 ML
BH CV ECHO MEAS - SV(TEICH): 78 ML
BH CV ECHO MEAS - TAPSE (>1.6): 2.7 CM
BH CV ECHO MEAS - TR MAX PG: 32 MMHG
BH CV ECHO MEAS - TR MAX VEL: 282.7 CM/SEC
BH CV REST NUCLEAR ISOTOPE DOSE: 9.9 MCI
BH CV STRESS BP STAGE 2: NORMAL
BH CV STRESS BP STAGE 4: NORMAL
BH CV STRESS COMMENTS STAGE 1: NORMAL
BH CV STRESS DOSE REGADENOSON STAGE 1: 0.4
BH CV STRESS DURATION MIN STAGE 1: 1
BH CV STRESS DURATION MIN STAGE 2: 1
BH CV STRESS DURATION MIN STAGE 3: 1
BH CV STRESS DURATION MIN STAGE 4: 1
BH CV STRESS DURATION SEC STAGE 2: 0
BH CV STRESS HR STAGE 1: 60
BH CV STRESS HR STAGE 2: 75
BH CV STRESS HR STAGE 3: 76
BH CV STRESS HR STAGE 4: 69
BH CV STRESS NUCLEAR ISOTOPE DOSE: 32.3 MCI
BH CV STRESS O2 STAGE 1: 98
BH CV STRESS O2 STAGE 2: 98
BH CV STRESS O2 STAGE 3: 98
BH CV STRESS O2 STAGE 4: 96
BH CV STRESS PROTOCOL 1: NORMAL
BH CV STRESS RECOVERY BP: NORMAL MMHG
BH CV STRESS RECOVERY HR: 69 BPM
BH CV STRESS RECOVERY O2: 99 %
BH CV STRESS STAGE 1: 1
BH CV STRESS STAGE 2: 2
BH CV STRESS STAGE 3: 3
BH CV STRESS STAGE 4: 4
BH CV VAS BP RIGHT ARM: NORMAL MMHG
BH CV XLRA - RV BASE: 3.6 CM
BH CV XLRA - RV LENGTH: 6.7 CM
BH CV XLRA - RV MID: 3.1 CM
LEFT ATRIUM VOLUME INDEX: 20.8 ML/M^2
LEFT ATRIUM VOLUME: 38.8 ML
LV EF 2D ECHO EST: 65 %
LV EF NUC BP: 88 %
MAXIMAL PREDICTED HEART RATE: 137 BPM
PERCENT MAX PREDICTED HR: 62.04 %
STRESS BASELINE BP: NORMAL MMHG
STRESS BASELINE HR: 56 BPM
STRESS O2 SAT REST: 98 %
STRESS PERCENT HR: 73 %
STRESS POST ESTIMATED WORKLOAD: 1 METS
STRESS POST EXERCISE DUR MIN: 4 MIN
STRESS POST EXERCISE DUR SEC: 0 SEC
STRESS POST O2 SAT PEAK: 96 %
STRESS POST PEAK BP: NORMAL MMHG
STRESS POST PEAK HR: 85 BPM
STRESS TARGET HR: 116 BPM

## 2021-06-22 PROCEDURE — 78452 HT MUSCLE IMAGE SPECT MULT: CPT

## 2021-06-22 PROCEDURE — 78452 HT MUSCLE IMAGE SPECT MULT: CPT | Performed by: INTERNAL MEDICINE

## 2021-06-22 PROCEDURE — 93018 CV STRESS TEST I&R ONLY: CPT | Performed by: INTERNAL MEDICINE

## 2021-06-22 PROCEDURE — 0 TECHNETIUM SESTAMIBI: Performed by: INTERNAL MEDICINE

## 2021-06-22 PROCEDURE — 93017 CV STRESS TEST TRACING ONLY: CPT

## 2021-06-22 PROCEDURE — 93306 TTE W/DOPPLER COMPLETE: CPT | Performed by: INTERNAL MEDICINE

## 2021-06-22 PROCEDURE — A9500 TC99M SESTAMIBI: HCPCS | Performed by: INTERNAL MEDICINE

## 2021-06-22 PROCEDURE — 25010000002 REGADENOSON 0.4 MG/5ML SOLUTION: Performed by: INTERNAL MEDICINE

## 2021-06-22 PROCEDURE — 93306 TTE W/DOPPLER COMPLETE: CPT

## 2021-06-22 RX ADMIN — TECHNETIUM TC 99M SESTAMIBI 1 DOSE: 1 INJECTION INTRAVENOUS at 08:30

## 2021-06-22 RX ADMIN — REGADENOSON 0.4 MG: 0.08 INJECTION, SOLUTION INTRAVENOUS at 10:02

## 2021-06-22 RX ADMIN — TECHNETIUM TC 99M SESTAMIBI 1 DOSE: 1 INJECTION INTRAVENOUS at 10:05

## 2021-06-25 ENCOUNTER — TELEPHONE (OUTPATIENT)
Dept: CARDIOLOGY | Facility: CLINIC | Age: 86
End: 2021-06-25

## 2021-06-25 NOTE — TELEPHONE ENCOUNTER
Spoke with wife, advised of echo and stress test results.   ----- Message from Andrae King MD sent at 6/22/2021  3:55 PM EDT -----  Normal results letter

## 2022-01-14 NOTE — PROGRESS NOTES
[facility] Cardiology  Subjective:     Encounter Date: 01/17/2022      Patient ID: Konstantin Limon is a 86 y.o. male.    Chief Complaint: Coronary Artery Disease, Slow Heart Rate, Hypertension, and Hyperlipidemia      PROBLEM LIST:  1. Coronary artery disease:  a. Status post-acute coronary syndrome, 2006, with cardiac catheterization revealing 95% mid LAD, which  was stented with a 2.75 x 18 mm Taxus.  Also, showed 60% first diagonal.  EF 50% with apex being hypokinetic.  b. MPS, 6/22/2021: EF >70%. No evidence of ischemia.   c. Echocardiogram, 6/22/2021: EF 65%. Systolic anterior motion of the chordal apparatus is present with associated mild mitral regurgitation  2. Hypertension.  3. Hyperlipidemia.  4. History of tobacco abuse.  5. Prostate cancer.  6. Recurrence head neck squamous cell CA  a. Status post radical neck/ear surgery with skin flap 2020  b. Recurrence 2021, treated with Keytruda  7. Glaucoma.  8. GERD/PUD.  9. History of CVA.  10. Surgical history:    a. TURP.  b. Mastoid surgery.  c. Appendectomy.  d. Recurrent basal cell carcinoma, status post radical neck/ear surgery with skin flap, 2010.  e. Mohs procedure      History of Present Illness  Konstantin Limon returns today for a follow up with a history of coronary artery disease and cardiac risk factors. Since last visit, the patient has been doing well overall from a cardiovascular standpoint. Pembrolizumab regimen was initiated in September 2021 for squamous cell carcinoma which has significantly improved patient's overall wellbeing. Before this his blood pressure was elevated due to pain and stress but with treatment it improved. Patient denies chest pain, shortness of breath, palpitations, edema, dizziness, and syncope.     Allergies   Allergen Reactions   • Niaspan [Niacin Er]      Flushing         Current Outpatient Medications:   •  aspirin 81 MG EC tablet, Take 1 tablet by mouth Daily., Disp: 90 tablet, Rfl:  3  •  B Complex Vitamins (B COMPLEX-B12 PO), Take 1 tablet by mouth Daily., Disp: , Rfl:   •  cetirizine (ZYRTEC ALLERGY) 10 MG tablet, Take 10 mg by mouth Daily., Disp: , Rfl:   •  cholecalciferol (VITAMIN D3) 1000 UNITS tablet, Take 1,000 Units by mouth Daily., Disp: , Rfl:   •  erythromycin (ROMYCIN) 5 MG/GM ophthalmic ointment, Administer  into the left eye As Needed., Disp: , Rfl:   •  gabapentin (NEURONTIN) 300 MG capsule, Take 1 capsule by mouth 3 (Three) Times a Day As Needed., Disp: , Rfl:   •  hydroCHLOROthiazide (HYDRODIURIL) 25 MG tablet, Take 1 tablet by mouth Daily., Disp: 90 tablet, Rfl: 3  •  latanoprost (XALATAN) 0.005 % ophthalmic solution, Administer 1 drop to both eyes Every Night., Disp: , Rfl:   •  metoprolol succinate XL (TOPROL-XL) 25 MG 24 hr tablet, Take 1 tablet by mouth Daily., Disp: 90 tablet, Rfl: 3  •  Multiple Vitamins-Minerals (MULTIVITAMIN ADULTS 50+ PO), Take 1 tablet by mouth Daily., Disp: , Rfl:   •  omeprazole (priLOSEC) 20 MG capsule, TAKE 1 CAPSULE EVERY DAY, Disp: 90 capsule, Rfl: 0  •  oxyCODONE (ROXICODONE) 10 MG tablet, Take 10-20 mg by mouth Every 6 (Six) Hours As Needed., Disp: , Rfl:   •  Pembrolizumab (KEYTRUDA IV), Keytruda, Disp: , Rfl:   •  prochlorperazine (COMPAZINE) 10 MG tablet, Take 10 mg by mouth Every 6 (Six) Hours As Needed., Disp: , Rfl:   •  ramipril (ALTACE) 5 MG capsule, Take 1 capsule by mouth Daily., Disp: 90 capsule, Rfl: 4  •  simvastatin (ZOCOR) 40 MG tablet, Take 1 tablet by mouth Every Night., Disp: 90 tablet, Rfl: 4  •  Timolol Maleate PF 0.5 % solution, Administer 1 drop to both eyes Daily., Disp: , Rfl:     The following portions of the patient's history were reviewed and updated as appropriate: allergies, current medications, past family history, past medical history, past social history, past surgical history and problem list.    Review of Systems   Constitutional: Negative.   Cardiovascular: Negative for chest pain, dyspnea on exertion, leg  "swelling, palpitations and syncope.   Respiratory: Negative.  Negative for shortness of breath.    Hematologic/Lymphatic: Negative for bleeding problem. Does not bruise/bleed easily.   Skin: Negative for rash.   Musculoskeletal: Negative for muscle weakness and myalgias.   Gastrointestinal: Negative for heartburn, nausea and vomiting.   Neurological: Negative for dizziness, light-headedness, loss of balance and numbness.          Objective:     Vitals:    01/17/22 0946   BP: 144/84   BP Location: Right arm   Patient Position: Sitting   Pulse: 70   SpO2: 96%   Weight: 73 kg (161 lb)   Height: 172.7 cm (67.99\")         Constitutional:       Appearance: Well-developed.   Neck:      Thyroid: No thyromegaly.      Vascular: No carotid bruit or JVD.   Pulmonary:      Breath sounds: Normal breath sounds.   Cardiovascular:      Regular rhythm.      No gallop. No S3 and S4 gallop.   Edema:     Peripheral edema absent.   Abdominal:      General: Bowel sounds are normal.      Palpations: Abdomen is soft. There is no abdominal mass.      Tenderness: There is no abdominal tenderness.   Skin:     General: Skin is warm and dry.      Findings: No rash.   Neurological:      Mental Status: Alert and oriented to person, place, and time.         Lab Review:    Lab date: 1/6/2022  • CMP: Glu 107, BUN 8, Creat 0.86, eGFR >60, Na 135, K 3.7, Cl 96, CO2 28, Ca 9.6, Alk Phos 101, AST 26, ALT 24  • CBC: WBC 5.37, RBC 4.91, HGB 14.5, HCT 43.5, MCV 89, MCH 29.5,     Lab date: 9/16/2021  • FLP: , TG 79, HDL 36, LDL 75      Procedures        Assessment:   Diagnoses and all orders for this visit:    1. Coronary artery disease involving native coronary artery of native heart without angina pectoris (Primary)    2. Essential hypertension    3. Mixed hyperlipidemia        Impression:  Coronary artery disease. Stable without angina. On aspirin.   Hypertension. Acceptable control. On HCTZ, ramipril, and metoprolol.   Hyperlipidemia. Well " controlled. On simvastatin.     Plan:  1. Stable cardiac status.  2. Continue current medications.  3. Revisit in 12 MO, or sooner as needed.    Scribed for Andrae King MD by Giulia Montana. 1/17/2022 09:53 EST    Andrae King MD      Please note that portions of this note may have been completed with a voice recognition program. Efforts were made to edit the dictations, but occasionally words are mistranscribed.

## 2022-01-17 ENCOUNTER — OFFICE VISIT (OUTPATIENT)
Dept: CARDIOLOGY | Facility: CLINIC | Age: 87
End: 2022-01-17

## 2022-01-17 VITALS
HEART RATE: 70 BPM | DIASTOLIC BLOOD PRESSURE: 84 MMHG | HEIGHT: 68 IN | OXYGEN SATURATION: 96 % | WEIGHT: 161 LBS | BODY MASS INDEX: 24.4 KG/M2 | SYSTOLIC BLOOD PRESSURE: 144 MMHG

## 2022-01-17 DIAGNOSIS — I10 ESSENTIAL HYPERTENSION: ICD-10-CM

## 2022-01-17 DIAGNOSIS — E78.2 MIXED HYPERLIPIDEMIA: ICD-10-CM

## 2022-01-17 DIAGNOSIS — I25.10 CORONARY ARTERY DISEASE INVOLVING NATIVE CORONARY ARTERY OF NATIVE HEART WITHOUT ANGINA PECTORIS: Primary | ICD-10-CM

## 2022-01-17 PROCEDURE — 99214 OFFICE O/P EST MOD 30 MIN: CPT | Performed by: INTERNAL MEDICINE

## 2022-01-17 RX ORDER — SIMVASTATIN 40 MG
40 TABLET ORAL NIGHTLY
Qty: 90 TABLET | Refills: 4 | Status: SHIPPED | OUTPATIENT
Start: 2022-01-17 | End: 2022-12-19 | Stop reason: SDUPTHER

## 2022-01-17 RX ORDER — PROCHLORPERAZINE MALEATE 10 MG
10 TABLET ORAL EVERY 6 HOURS PRN
COMMUNITY
Start: 2021-08-30

## 2022-01-17 RX ORDER — METOPROLOL SUCCINATE 25 MG/1
25 TABLET, EXTENDED RELEASE ORAL DAILY
Qty: 90 TABLET | Refills: 4 | Status: SHIPPED | OUTPATIENT
Start: 2022-01-17 | End: 2022-12-19 | Stop reason: SDUPTHER

## 2022-01-17 RX ORDER — RAMIPRIL 5 MG/1
5 CAPSULE ORAL DAILY
Qty: 90 CAPSULE | Refills: 4 | Status: SHIPPED | OUTPATIENT
Start: 2022-01-17 | End: 2022-12-19 | Stop reason: SDUPTHER

## 2022-01-17 RX ORDER — HYDROCHLOROTHIAZIDE 25 MG/1
25 TABLET ORAL DAILY
Qty: 90 TABLET | Refills: 4 | Status: SHIPPED | OUTPATIENT
Start: 2022-01-17 | End: 2022-12-19 | Stop reason: SDUPTHER

## 2022-01-17 RX ORDER — OXYCODONE HYDROCHLORIDE 10 MG/1
10-20 TABLET ORAL EVERY 6 HOURS PRN
COMMUNITY

## 2022-12-19 ENCOUNTER — OFFICE VISIT (OUTPATIENT)
Dept: CARDIOLOGY | Facility: CLINIC | Age: 87
End: 2022-12-19

## 2022-12-19 VITALS
BODY MASS INDEX: 24.25 KG/M2 | OXYGEN SATURATION: 95 % | WEIGHT: 160 LBS | HEART RATE: 62 BPM | SYSTOLIC BLOOD PRESSURE: 128 MMHG | DIASTOLIC BLOOD PRESSURE: 80 MMHG | HEIGHT: 68 IN

## 2022-12-19 DIAGNOSIS — I10 ESSENTIAL HYPERTENSION: ICD-10-CM

## 2022-12-19 DIAGNOSIS — E78.2 MIXED HYPERLIPIDEMIA: ICD-10-CM

## 2022-12-19 DIAGNOSIS — I25.10 CORONARY ARTERY DISEASE INVOLVING NATIVE CORONARY ARTERY OF NATIVE HEART WITHOUT ANGINA PECTORIS: Primary | ICD-10-CM

## 2022-12-19 PROCEDURE — 99214 OFFICE O/P EST MOD 30 MIN: CPT | Performed by: NURSE PRACTITIONER

## 2022-12-19 RX ORDER — HYDROCHLOROTHIAZIDE 25 MG/1
25 TABLET ORAL DAILY
Qty: 90 TABLET | Refills: 4 | Status: SHIPPED | OUTPATIENT
Start: 2022-12-19

## 2022-12-19 RX ORDER — SIMVASTATIN 40 MG
40 TABLET ORAL NIGHTLY
Qty: 90 TABLET | Refills: 4 | Status: SHIPPED | OUTPATIENT
Start: 2022-12-19

## 2022-12-19 RX ORDER — RAMIPRIL 5 MG/1
5 CAPSULE ORAL DAILY
Qty: 90 CAPSULE | Refills: 4 | Status: SHIPPED | OUTPATIENT
Start: 2022-12-19

## 2022-12-19 RX ORDER — METOPROLOL SUCCINATE 25 MG/1
25 TABLET, EXTENDED RELEASE ORAL DAILY
Qty: 90 TABLET | Refills: 4 | Status: SHIPPED | OUTPATIENT
Start: 2022-12-19

## 2022-12-19 NOTE — PROGRESS NOTES
Subjective:     Encounter Date:12/19/2022    Primary Care Physician: Sim Dior MD      Patient ID: Konstantin Limon is a 87 y.o. male.    Chief Complaint:Coronary Artery Disease    PROBLEM LIST:  1. Coronary artery disease:  a. Status post-acute coronary syndrome, 2006, with cardiac catheterization revealing 95% mid LAD, which  was stented with a 2.75 x 18 mm Taxus.  Also, showed 60% first diagonal.  EF 50% with apex being hypokinetic.  b. MPS, 6/22/2021: EF >70%. No evidence of ischemia.   c. Echocardiogram, 6/22/2021: EF 65%. Systolic anterior motion of the chordal apparatus is present with associated mild mitral regurgitation  2. Hypertension.  3. Hyperlipidemia.  4. History of tobacco abuse.  5. Prostate cancer.  6. Recurrence head neck squamous cell CA  a. Status post radical neck/ear surgery with skin flap 2020  b. Recurrence 2021, treated with Keytruda  7. Glaucoma.  8. GERD/PUD.  9. History of CVA.  10. Surgical history:    a. TURP.  b. Mastoid surgery.  c. Appendectomy.  d. Recurrent basal cell carcinoma, status post radical neck/ear surgery with skin flap, 2010.  e. Mohs procedure      Allergies   Allergen Reactions   • Niaspan [Niacin Er] Other (See Comments)     Flushing         Current Outpatient Medications:   •  aspirin 81 MG EC tablet, Take 1 tablet by mouth Daily., Disp: 90 tablet, Rfl: 3  •  B Complex Vitamins (B COMPLEX-B12 PO), Take 1 tablet by mouth Daily., Disp: , Rfl:   •  cetirizine (zyrTEC) 10 MG tablet, Take 10 mg by mouth Daily., Disp: , Rfl:   •  cholecalciferol (VITAMIN D3) 1000 UNITS tablet, Take 1,000 Units by mouth Daily., Disp: , Rfl:   •  erythromycin (ROMYCIN) 5 MG/GM ophthalmic ointment, Administer  into the left eye As Needed., Disp: , Rfl:   •  gabapentin (NEURONTIN) 300 MG capsule, Take 1 capsule by mouth 3 (Three) Times a Day As Needed., Disp: , Rfl:   •  hydroCHLOROthiazide (HYDRODIURIL) 25 MG tablet, Take 1 tablet by mouth Daily., Disp: 90 tablet, Rfl: 4  •   latanoprost (XALATAN) 0.005 % ophthalmic solution, Administer 1 drop to both eyes Every Night., Disp: , Rfl:   •  metoprolol succinate XL (TOPROL-XL) 25 MG 24 hr tablet, Take 1 tablet by mouth Daily., Disp: 90 tablet, Rfl: 4  •  Multiple Vitamins-Minerals (MULTIVITAMIN ADULTS 50+ PO), Take 1 tablet by mouth Daily., Disp: , Rfl:   •  omeprazole (priLOSEC) 20 MG capsule, TAKE 1 CAPSULE EVERY DAY, Disp: 90 capsule, Rfl: 0  •  oxyCODONE (ROXICODONE) 10 MG tablet, Take 10-20 mg by mouth Every 6 (Six) Hours As Needed., Disp: , Rfl:   •  Pembrolizumab (KEYTRUDA IV), Keytruda, Disp: , Rfl:   •  prochlorperazine (COMPAZINE) 10 MG tablet, Take 10 mg by mouth Every 6 (Six) Hours As Needed., Disp: , Rfl:   •  ramipril (ALTACE) 5 MG capsule, Take 1 capsule by mouth Daily., Disp: 90 capsule, Rfl: 4  •  simvastatin (ZOCOR) 40 MG tablet, Take 1 tablet by mouth Every Night., Disp: 90 tablet, Rfl: 4  •  Timolol Maleate PF 0.5 % solution, Administer 1 drop to both eyes Daily., Disp: , Rfl:         History of Present Illness    Patient is a 87-year-old  male who is being seen today for annual follow-up of coronary artery disease and cardiac risk factors.  Since last being seen he notes to overall be doing well.  He walks about a mile every day.  Denies any chest pain, pressure, tightness.  Denies any increasing shortness of breath.  No reported syncope, near-syncope, or edema.  Notes that he is compliant with his medications.    The following portions of the patient's history were reviewed and updated as appropriate: allergies, current medications, past family history, past medical history, past social history, past surgical history and problem list.      Social History     Tobacco Use   • Smoking status: Former     Packs/day: 1.00     Types: Cigarettes     Quit date:      Years since quittin.9   • Smokeless tobacco: Never   Vaping Use   • Vaping Use: Never used   Substance Use Topics   • Alcohol use: No   • Drug use:  "No         Review of Systems   Constitutional: Negative.   Cardiovascular: Negative for chest pain, dyspnea on exertion, leg swelling, palpitations and syncope.   Respiratory: Negative.  Negative for shortness of breath.    Hematologic/Lymphatic: Negative for bleeding problem. Does not bruise/bleed easily.   Skin: Negative for rash.   Musculoskeletal: Positive for arthritis. Negative for muscle weakness and myalgias.   Gastrointestinal: Negative for heartburn, nausea and vomiting.   Neurological: Negative for dizziness, light-headedness, loss of balance and numbness.          Objective:   /80 (BP Location: Right arm, Patient Position: Sitting)   Pulse 62   Ht 172.7 cm (68\")   Wt 72.6 kg (160 lb)   SpO2 95%   BMI 24.33 kg/m²         Vitals reviewed.   Constitutional:       Appearance: Well-developed and not in distress.   Neck:      Vascular: No JVD.      Trachea: No tracheal deviation.   Pulmonary:      Effort: Pulmonary effort is normal.      Breath sounds: Normal breath sounds.   Cardiovascular:      Normal rate. Regular rhythm.   Edema:     Peripheral edema absent.   Abdominal:      Palpations: Abdomen is soft.      Tenderness: There is no abdominal tenderness.   Neurological:      Mental Status: Alert and oriented to person, place, and time.         Procedures          Assessment:   Assessment & Plan      Diagnoses and all orders for this visit:    1. Coronary artery disease involving native coronary artery of native heart without angina pectoris (Primary).  Stable.  No angina.  Very active without symptoms.  On aspirin.    2. Essential hypertension, controlled.  On ACE inhibitor.    3. Mixed hyperlipidemia, stable.  On statin.  Most recent LDL 81.      Plan:  1. Continue current cardiac medications.  2. Encouraged patient not to exercise in extreme temperatures.  3. Follow-up in 1 years time or sooner if needed.       Jesusita GARCIA     Dictated utilizing Dragon dictation  "

## 2023-01-09 ENCOUNTER — TELEPHONE (OUTPATIENT)
Dept: CARDIOLOGY | Facility: CLINIC | Age: 88
End: 2023-01-09
Payer: MEDICARE

## 2023-01-09 NOTE — TELEPHONE ENCOUNTER
Requesting letter to provide to insurance company to see if they will assist in paying for treadmill- pt does exercise daily but when cold, he is not able to get outside. Letter just needs to include that it is medically necessary to exercise daily and why he can't go outside if weather is bad.

## 2024-01-08 ENCOUNTER — OFFICE VISIT (OUTPATIENT)
Dept: CARDIOLOGY | Facility: CLINIC | Age: 89
End: 2024-01-08
Payer: MEDICARE

## 2024-01-08 VITALS
WEIGHT: 162 LBS | BODY MASS INDEX: 24.55 KG/M2 | HEIGHT: 68 IN | HEART RATE: 66 BPM | SYSTOLIC BLOOD PRESSURE: 136 MMHG | OXYGEN SATURATION: 94 % | DIASTOLIC BLOOD PRESSURE: 68 MMHG

## 2024-01-08 DIAGNOSIS — I10 ESSENTIAL HYPERTENSION: ICD-10-CM

## 2024-01-08 DIAGNOSIS — I25.10 CORONARY ARTERY DISEASE INVOLVING NATIVE CORONARY ARTERY OF NATIVE HEART WITHOUT ANGINA PECTORIS: Primary | ICD-10-CM

## 2024-01-08 DIAGNOSIS — E78.2 MIXED HYPERLIPIDEMIA: ICD-10-CM

## 2024-01-08 PROCEDURE — 99213 OFFICE O/P EST LOW 20 MIN: CPT | Performed by: INTERNAL MEDICINE

## 2024-01-08 PROCEDURE — 93000 ELECTROCARDIOGRAM COMPLETE: CPT | Performed by: INTERNAL MEDICINE

## 2024-01-08 PROCEDURE — 1160F RVW MEDS BY RX/DR IN RCRD: CPT | Performed by: INTERNAL MEDICINE

## 2024-01-08 PROCEDURE — 1159F MED LIST DOCD IN RCRD: CPT | Performed by: INTERNAL MEDICINE

## 2024-01-08 RX ORDER — METOPROLOL SUCCINATE 25 MG/1
25 TABLET, EXTENDED RELEASE ORAL DAILY
Qty: 90 TABLET | Refills: 4 | Status: SHIPPED | OUTPATIENT
Start: 2024-01-08

## 2024-01-08 RX ORDER — SIMVASTATIN 40 MG
40 TABLET ORAL NIGHTLY
Qty: 90 TABLET | Refills: 4 | Status: SHIPPED | OUTPATIENT
Start: 2024-01-08

## 2024-01-08 RX ORDER — HYDROCHLOROTHIAZIDE 25 MG/1
25 TABLET ORAL DAILY
Qty: 90 TABLET | Refills: 4 | Status: SHIPPED | OUTPATIENT
Start: 2024-01-08

## 2024-01-08 RX ORDER — GABAPENTIN 400 MG/1
1 CAPSULE ORAL 3 TIMES DAILY
COMMUNITY
Start: 2023-09-18

## 2024-01-08 RX ORDER — RAMIPRIL 5 MG/1
5 CAPSULE ORAL DAILY
Qty: 90 CAPSULE | Refills: 4 | Status: SHIPPED | OUTPATIENT
Start: 2024-01-08

## 2024-01-08 NOTE — PROGRESS NOTES
Subjective:     Encounter Date:01/08/2024    Primary Care Physician: Sim Dior MD      Patient ID: Konstantin Limon is a 88 y.o. male.    Chief Complaint:Coronary artery disease involving native coronary      PROBLEM LIST:  Coronary artery disease:  Status post-acute coronary syndrome, 2006, with cardiac catheterization revealing 95% mid LAD, which  was stented with a 2.75 x 18 mm Taxus.  Also, showed 60% first diagonal.  EF 50% with apex being hypokinetic.  MPS, 6/22/2021: EF >70%. No evidence of ischemia.   Echocardiogram, 6/22/2021: EF 65%. Systolic anterior motion of the chordal apparatus is present with associated mild mitral regurgitation  Hypertension.  Hyperlipidemia.  History of tobacco abuse.  Prostate cancer.  Recurrence head neck squamous cell CA  Status post radical neck/ear surgery with skin flap 2020  Recurrence 2021, treated with Keytruda  Glaucoma.  GERD/PUD.  History of CVA.  10. Surgical history:    TURP.  Mastoid surgery.  Appendectomy.  Recurrent basal cell carcinoma, status post radical neck/ear surgery with skin flap, 2010.  Mohs procedure      Allergies   Allergen Reactions    Niaspan [Niacin Er] Other (See Comments)     Flushing         Current Outpatient Medications:     aspirin 81 MG EC tablet, Take 1 tablet by mouth Daily., Disp: 90 tablet, Rfl: 3    B Complex Vitamins (B COMPLEX-B12 PO), Take 1 tablet by mouth Daily., Disp: , Rfl:     cetirizine (zyrTEC) 10 MG tablet, Take 1 tablet by mouth Daily., Disp: , Rfl:     cholecalciferol (VITAMIN D3) 1000 UNITS tablet, Take 1 tablet by mouth Daily., Disp: , Rfl:     erythromycin (ROMYCIN) 5 MG/GM ophthalmic ointment, Administer  into the left eye As Needed., Disp: , Rfl:     gabapentin (NEURONTIN) 400 MG capsule, Take 1 capsule by mouth 3 (Three) Times a Day., Disp: , Rfl:     hydroCHLOROthiazide (HYDRODIURIL) 25 MG tablet, Take 1 tablet by mouth Daily., Disp: 90 tablet, Rfl: 4    latanoprost (XALATAN) 0.005 % ophthalmic solution,  "Administer 1 drop to both eyes Every Night., Disp: , Rfl:     metoprolol succinate XL (TOPROL-XL) 25 MG 24 hr tablet, Take 1 tablet by mouth Daily., Disp: 90 tablet, Rfl: 4    Multiple Vitamins-Minerals (MULTIVITAMIN ADULTS 50+ PO), Take 1 tablet by mouth Daily., Disp: , Rfl:     omeprazole (priLOSEC) 20 MG capsule, TAKE 1 CAPSULE EVERY DAY, Disp: 90 capsule, Rfl: 0    Pembrolizumab (KEYTRUDA IV), Keytruda, Disp: , Rfl:     ramipril (ALTACE) 5 MG capsule, Take 1 capsule by mouth Daily., Disp: 90 capsule, Rfl: 4    simvastatin (ZOCOR) 40 MG tablet, Take 1 tablet by mouth Every Night., Disp: 90 tablet, Rfl: 4    Timolol Maleate PF 0.5 % solution, Administer 1 drop to both eyes Daily., Disp: , Rfl:     oxyCODONE (ROXICODONE) 10 MG tablet, Take 10-20 mg by mouth Every 6 (Six) Hours As Needed. (Patient not taking: Reported on 2024), Disp: , Rfl:     prochlorperazine (COMPAZINE) 10 MG tablet, Take 10 mg by mouth Every 6 (Six) Hours As Needed. (Patient not taking: Reported on 2024), Disp: , Rfl:         History of Present Illness    Patient returns today for annual follow-up of CAD and risk factors. Patient notes to overall doing well from a cardiac standpoint. No reported chest pain, dyspnea, syncope, or pre syncope.     The following portions of the patient's history were reviewed and updated as appropriate: allergies, current medications, past family history, past medical history, past social history, past surgical history and problem list.      Social History     Tobacco Use    Smoking status: Former     Packs/day: 1     Types: Cigarettes     Quit date:      Years since quittin.0     Passive exposure: Past    Smokeless tobacco: Never   Vaping Use    Vaping Use: Never used   Substance Use Topics    Alcohol use: No    Drug use: No         ROS       Objective:   /68 (BP Location: Right arm, Patient Position: Sitting)   Pulse 66   Ht 172.7 cm (68\")   Wt 73.5 kg (162 lb)   SpO2 94%   BMI 24.63 " kg/m²         Vitals reviewed.   Constitutional:       Appearance: Well-developed and not in distress.   Neck:      Vascular: No JVD.      Trachea: No tracheal deviation.   Pulmonary:      Effort: Pulmonary effort is normal.      Breath sounds: Normal breath sounds.   Cardiovascular:      Normal rate. Regular rhythm.      Murmurs: There is no murmur.   Edema:     Peripheral edema absent.   Musculoskeletal:         General: No deformity. Skin:     General: Skin is warm and dry.   Neurological:      Mental Status: Alert and oriented to person, place, and time.           ECG 12 Lead    Date/Time: 1/8/2024 10:34 AM  Performed by: Andrae King MD    Authorized by: Andrae King MD  Comparison: not compared with previous ECG   Previous ECG: no previous ECG available  Rhythm: sinus rhythm  Ectopy: atrial premature contractions                Assessment:   Assessment & Plan      Diagnoses and all orders for this visit:    1. Coronary artery disease involving native coronary artery of native heart without angina pectoris (Primary)    2. Essential hypertension    3. Mixed hyperlipidemia      Assessment:  Coronary artery disease, stable. On ASA.  Hypertension, controlled. On HCTZ, ramipril, and BB>  Dyslipidemia, on statin. Labs with PCP    Plan:  Patient stable from CV standpoint.  Continue current CV medications.  Follow-up in 1 year or sooner if needed.          JOSE Villatoro scribed this dictation for Dr. Andrae King.       I have seen and examined the patient, I have reviewed the note, discussed the case with the advance practice clinician, made necessary changes and I agree with the final note.    Andrae King MD  01/08/24  12:55 EST      Dictated utilizing Dragon dictation

## 2024-10-15 ENCOUNTER — TELEPHONE (OUTPATIENT)
Dept: CARDIOLOGY | Facility: CLINIC | Age: 89
End: 2024-10-15
Payer: MEDICARE

## 2024-10-15 DIAGNOSIS — R07.9 CHEST PAIN, UNSPECIFIED TYPE: Primary | ICD-10-CM

## 2024-10-15 NOTE — TELEPHONE ENCOUNTER
Caller: Kyung Limon    Relationship to patient: Emergency Contact    Best call back number: [phone]    Chief complaint: PATIENT HAD A TIGHTNESS IN HIS CHEST A COUPLE OF DAYS AGO. WAS ADVISED BY HIS ONCOLOGIST TO HAVE IT CHECKED OUT.     Type of visit: FU    Requested date: ANY

## 2024-10-15 NOTE — TELEPHONE ENCOUNTER
Spoke with patient;s wife, patient c/o tightness in his chest during mowing the lawn.  Just once, no other symptoms or complaints.  No unusual BP or HR readings to report.  They leave next week for a cruise and said the oncologist advised him to get checked out before going on trip.     Last echo and stress 2021

## 2024-10-18 ENCOUNTER — HOSPITAL ENCOUNTER (OUTPATIENT)
Dept: CARDIOLOGY | Facility: HOSPITAL | Age: 89
Discharge: HOME | End: 2024-10-18
Payer: MEDICARE

## 2024-10-18 VITALS
HEIGHT: 68 IN | SYSTOLIC BLOOD PRESSURE: 122 MMHG | DIASTOLIC BLOOD PRESSURE: 76 MMHG | BODY MASS INDEX: 24.55 KG/M2 | HEART RATE: 61 BPM | WEIGHT: 162 LBS

## 2024-10-18 DIAGNOSIS — R07.9 CHEST PAIN, UNSPECIFIED TYPE: ICD-10-CM

## 2024-10-18 LAB
BH CV REST NUCLEAR ISOTOPE DOSE: 9.9 MCI
BH CV STRESS BP STAGE 1: NORMAL
BH CV STRESS BP STAGE 2: NORMAL
BH CV STRESS DURATION MIN STAGE 1: 3
BH CV STRESS DURATION MIN STAGE 2: 3
BH CV STRESS DURATION MIN STAGE 3: 1
BH CV STRESS DURATION SEC STAGE 1: 0
BH CV STRESS DURATION SEC STAGE 2: 0
BH CV STRESS DURATION SEC STAGE 3: 30
BH CV STRESS GRADE STAGE 1: 10
BH CV STRESS GRADE STAGE 2: 12
BH CV STRESS GRADE STAGE 3: 14
BH CV STRESS HR STAGE 1: 94
BH CV STRESS HR STAGE 2: 111
BH CV STRESS HR STAGE 3: 113
BH CV STRESS METS STAGE 1: 5
BH CV STRESS METS STAGE 2: 7.5
BH CV STRESS METS STAGE 3: 10
BH CV STRESS NUCLEAR ISOTOPE DOSE: 32.8 MCI
BH CV STRESS O2 STAGE 1: 94
BH CV STRESS O2 STAGE 2: 96
BH CV STRESS O2 STAGE 3: 93
BH CV STRESS PROTOCOL 1: NORMAL
BH CV STRESS RECOVERY BP: NORMAL MMHG
BH CV STRESS RECOVERY HR: 71 BPM
BH CV STRESS RECOVERY O2: 95 %
BH CV STRESS SPEED STAGE 1: 1.7
BH CV STRESS SPEED STAGE 2: 2.5
BH CV STRESS SPEED STAGE 3: 3.4
BH CV STRESS STAGE 1: 1
BH CV STRESS STAGE 2: 2
BH CV STRESS STAGE 3: 3
LV EF NUC BP: 82 %
MAXIMAL PREDICTED HEART RATE: 133 BPM
PERCENT MAX PREDICTED HR: 93.98 %
STRESS BASELINE BP: NORMAL MMHG
STRESS BASELINE HR: 57 BPM
STRESS O2 SAT REST: 96 %
STRESS PERCENT HR: 111 %
STRESS POST ESTIMATED WORKLOAD: 7.1 METS
STRESS POST EXERCISE DUR MIN: 7 MIN
STRESS POST EXERCISE DUR SEC: 30 SEC
STRESS POST O2 SAT PEAK: 93 %
STRESS POST PEAK BP: NORMAL MMHG
STRESS POST PEAK HR: 125 BPM
STRESS TARGET HR: 113 BPM

## 2024-10-18 PROCEDURE — A9500 TC99M SESTAMIBI: HCPCS | Performed by: NURSE PRACTITIONER

## 2024-10-18 PROCEDURE — 0 TECHNETIUM SESTAMIBI: Performed by: NURSE PRACTITIONER

## 2024-10-18 PROCEDURE — 93017 CV STRESS TEST TRACING ONLY: CPT

## 2024-10-18 PROCEDURE — 78452 HT MUSCLE IMAGE SPECT MULT: CPT

## 2024-10-18 RX ADMIN — TECHNETIUM TC 99M SESTAMIBI 1 DOSE: 1 INJECTION INTRAVENOUS at 08:00

## 2024-10-18 RX ADMIN — TECHNETIUM TC 99M SESTAMIBI 1 DOSE: 1 INJECTION INTRAVENOUS at 09:40

## 2025-01-10 NOTE — PROGRESS NOTES
Subjective:     Encounter Date:01/13/2025    Primary Care Physician: Genevieve Woodward PA      Patient ID: Konstantin Limon is a 89 y.o. male.    Chief Complaint:Coronary Artery Disease      PROBLEM LIST:  Coronary artery disease:  Status post-acute coronary syndrome, 2006, with cardiac catheterization revealing 95% mid LAD, which  was stented with a 2.75 x 18 mm Taxus.  Also, showed 60% first diagonal.  EF 50% with apex being hypokinetic.  MPS, 6/22/2021: EF >70%. No evidence of ischemia.   Echocardiogram, 6/22/2021: EF 65%. Systolic anterior motion of the chordal apparatus is present with associated mild mitral regurgitation  10/2024 Normal MPS  Hypertension.  Hyperlipidemia.  History of tobacco abuse.  Prostate cancer.  Recurrence head neck squamous cell CA  Status post radical neck/ear surgery with skin flap 2020  Recurrence 2021, treated with Keytruda  Glaucoma.  GERD/PUD.  History of CVA.  10. Surgical history:    TURP.  Mastoid surgery.  Appendectomy.  Recurrent basal cell carcinoma, status post radical neck/ear surgery with skin flap, 2010.  Mohs procedure      Allergies   Allergen Reactions    Niaspan [Niacin Er (Antihyperlipidemic)] Other (See Comments)     Flushing         Current Outpatient Medications:     aspirin 81 MG EC tablet, Take 1 tablet by mouth Daily., Disp: 90 tablet, Rfl: 3    B Complex Vitamins (B COMPLEX-B12 PO), Take 1 tablet by mouth Daily., Disp: , Rfl:     cetirizine (zyrTEC) 10 MG tablet, Take 1 tablet by mouth Daily., Disp: , Rfl:     cholecalciferol (VITAMIN D3) 1000 UNITS tablet, Take 1 tablet by mouth Daily., Disp: , Rfl:     erythromycin (ROMYCIN) 5 MG/GM ophthalmic ointment, Administer  into the left eye As Needed., Disp: , Rfl:     gabapentin (NEURONTIN) 400 MG capsule, Take 1 capsule by mouth 3 (Three) Times a Day., Disp: , Rfl:     hydroCHLOROthiazide (HYDRODIURIL) 25 MG tablet, Take 1 tablet by mouth Daily., Disp: 90 tablet, Rfl: 4    latanoprost (XALATAN) 0.005 % ophthalmic  "solution, Administer 1 drop to both eyes Every Night., Disp: , Rfl:     metoprolol succinate XL (TOPROL-XL) 25 MG 24 hr tablet, Take 1 tablet by mouth Daily., Disp: 90 tablet, Rfl: 4    Multiple Vitamins-Minerals (MULTIVITAMIN ADULTS 50+ PO), Take 1 tablet by mouth Daily., Disp: , Rfl:     omeprazole (priLOSEC) 20 MG capsule, TAKE 1 CAPSULE EVERY DAY, Disp: 90 capsule, Rfl: 0    Pembrolizumab (KEYTRUDA IV), Keytruda, Disp: , Rfl:     ramipril (ALTACE) 5 MG capsule, Take 1 capsule by mouth Daily., Disp: 90 capsule, Rfl: 4    simvastatin (ZOCOR) 40 MG tablet, Take 1 tablet by mouth Every Night., Disp: 90 tablet, Rfl: 4    Timolol Maleate PF 0.5 % solution, Administer 1 drop to both eyes Daily., Disp: , Rfl:         History of Present Illness    Patient is an 89-year-old  male who presents today for annual follow-up of history of coronary artery disease.  Since last being seen he notes overall doing well from cardiac standpoint.  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  He denies any chest pain, pressure, tightness.  Had 1 episode this past fall.  He contacted the office and was set up for stress test which was negative.  No reported syncope, presyncope, or edema.  Overall, feels well from cardiac standpoint.    The following portions of the patient's history were reviewed and updated as appropriate: allergies, current medications, past family history, past medical history, past social history, past surgical history and problem list.      Social History     Tobacco Use    Smoking status: Former     Current packs/day: 0.00     Types: Cigarettes     Quit date:      Years since quittin.0     Passive exposure: Past    Smokeless tobacco: Never   Vaping Use    Vaping status: Never Used   Substance Use Topics    Alcohol use: No    Drug use: No         ROS       Objective:   /64 (BP Location: Right arm, Patient Position: Sitting)   Pulse 69   Ht 172.7 cm (68\")   Wt 73 kg (161 lb)   SpO2 97%   BMI 24.48 " kg/m²         Vitals reviewed.   Constitutional:       Appearance: Well-developed and not in distress.   Neck:      Vascular: No JVD.      Trachea: No tracheal deviation.   Pulmonary:      Effort: Pulmonary effort is normal.      Breath sounds: Normal breath sounds.   Cardiovascular:      Normal rate. Regular rhythm.      Murmurs: There is no murmur.   Edema:     Peripheral edema absent.   Skin:     General: Skin is warm and dry.   Neurological:      Mental Status: Alert and oriented to person, place, and time.         Procedures          Assessment:   Assessment & Plan      Diagnoses and all orders for this visit:    1. Coronary artery disease involving native coronary artery of native heart without angina pectoris (Primary), stable.  Normal MPS in October.  On aspirin.    2. Essential hypertension, well-controlled.  On ACE inhibitor and HCTZ.    3. Mixed hyperlipidemia, stable.  Recent labs performed through [facility] shows LDL of 72.  On statin.      Plan:  Patient is overall stable from cardiac standpoint.  Continue current cardiac medications.  Follow-up in 1 years time or sooner if needed.       Jesusita GARCIA             Dictated utilizing Dragon dictation

## 2025-01-13 ENCOUNTER — OFFICE VISIT (OUTPATIENT)
Dept: CARDIOLOGY | Facility: CLINIC | Age: 89
End: 2025-01-13
Payer: MEDICARE

## 2025-01-13 VITALS
HEART RATE: 69 BPM | DIASTOLIC BLOOD PRESSURE: 64 MMHG | OXYGEN SATURATION: 97 % | SYSTOLIC BLOOD PRESSURE: 132 MMHG | HEIGHT: 68 IN | BODY MASS INDEX: 24.4 KG/M2 | WEIGHT: 161 LBS

## 2025-01-13 DIAGNOSIS — E78.2 MIXED HYPERLIPIDEMIA: ICD-10-CM

## 2025-01-13 DIAGNOSIS — I25.10 CORONARY ARTERY DISEASE INVOLVING NATIVE CORONARY ARTERY OF NATIVE HEART WITHOUT ANGINA PECTORIS: Primary | ICD-10-CM

## 2025-01-13 DIAGNOSIS — I10 ESSENTIAL HYPERTENSION: ICD-10-CM

## 2025-01-13 PROCEDURE — 1160F RVW MEDS BY RX/DR IN RCRD: CPT | Performed by: NURSE PRACTITIONER

## 2025-01-13 PROCEDURE — 1159F MED LIST DOCD IN RCRD: CPT | Performed by: NURSE PRACTITIONER

## 2025-01-13 PROCEDURE — 99214 OFFICE O/P EST MOD 30 MIN: CPT | Performed by: NURSE PRACTITIONER

## 2025-01-13 RX ORDER — AMOXICILLIN 250 MG/1
500 CAPSULE ORAL 3 TIMES DAILY
COMMUNITY
Start: 2025-01-06 | End: 2025-01-13

## 2025-01-13 NOTE — LETTER
January 13, 2025     STANLEY Bella  [address] [place] [id number]    Patient: Konstantin Limon   YOB: 1935   Date of Visit: 1/13/2025     Dear STANLEY Bella:       Thank you for referring Konstantin Limon to me for evaluation. Below are the relevant portions of my assessment and plan of care.    If you have questions, please do not hesitate to call me. I look forward to following Konstantin along with you.         Sincerely,        JOSE Villatoro        CC: No Recipients    Jesusita Infante APRN  01/13/25 1234  Sign when Signing Visit  Subjective:     Encounter Date:01/13/2025    Primary Care Physician: Genevieve Woodward PA      Patient ID: Konstantin Limon is a 89 y.o. male.    Chief Complaint:Coronary Artery Disease      PROBLEM LIST:  Coronary artery disease:  Status post-acute coronary syndrome, 2006, with cardiac catheterization revealing 95% mid LAD, which  was stented with a 2.75 x 18 mm Taxus.  Also, showed 60% first diagonal.  EF 50% with apex being hypokinetic.  MPS, 6/22/2021: EF >70%. No evidence of ischemia.   Echocardiogram, 6/22/2021: EF 65%. Systolic anterior motion of the chordal apparatus is present with associated mild mitral regurgitation  10/2024 Normal MPS  Hypertension.  Hyperlipidemia.  History of tobacco abuse.  Prostate cancer.  Recurrence head neck squamous cell CA  Status post radical neck/ear surgery with skin flap 2020  Recurrence 2021, treated with Keytruda  Glaucoma.  GERD/PUD.  History of CVA.  10. Surgical history:    TURP.  Mastoid surgery.  Appendectomy.  Recurrent basal cell carcinoma, status post radical neck/ear surgery with skin flap, 2010.  Mohs procedure      Allergies   Allergen Reactions   • Niaspan [Niacin Er (Antihyperlipidemic)] Other (See Comments)     Flushing         Current Outpatient Medications:   •  aspirin 81 MG EC tablet, Take 1 tablet by mouth Daily., Disp: 90 tablet, Rfl: 3  •  B Complex Vitamins (B COMPLEX-B12 PO),  Take 1 tablet by mouth Daily., Disp: , Rfl:   •  cetirizine (zyrTEC) 10 MG tablet, Take 1 tablet by mouth Daily., Disp: , Rfl:   •  cholecalciferol (VITAMIN D3) 1000 UNITS tablet, Take 1 tablet by mouth Daily., Disp: , Rfl:   •  erythromycin (ROMYCIN) 5 MG/GM ophthalmic ointment, Administer  into the left eye As Needed., Disp: , Rfl:   •  gabapentin (NEURONTIN) 400 MG capsule, Take 1 capsule by mouth 3 (Three) Times a Day., Disp: , Rfl:   •  hydroCHLOROthiazide (HYDRODIURIL) 25 MG tablet, Take 1 tablet by mouth Daily., Disp: 90 tablet, Rfl: 4  •  latanoprost (XALATAN) 0.005 % ophthalmic solution, Administer 1 drop to both eyes Every Night., Disp: , Rfl:   •  metoprolol succinate XL (TOPROL-XL) 25 MG 24 hr tablet, Take 1 tablet by mouth Daily., Disp: 90 tablet, Rfl: 4  •  Multiple Vitamins-Minerals (MULTIVITAMIN ADULTS 50+ PO), Take 1 tablet by mouth Daily., Disp: , Rfl:   •  omeprazole (priLOSEC) 20 MG capsule, TAKE 1 CAPSULE EVERY DAY, Disp: 90 capsule, Rfl: 0  •  Pembrolizumab (KEYTRUDA IV), Keytruda, Disp: , Rfl:   •  ramipril (ALTACE) 5 MG capsule, Take 1 capsule by mouth Daily., Disp: 90 capsule, Rfl: 4  •  simvastatin (ZOCOR) 40 MG tablet, Take 1 tablet by mouth Every Night., Disp: 90 tablet, Rfl: 4  •  Timolol Maleate PF 0.5 % solution, Administer 1 drop to both eyes Daily., Disp: , Rfl:         History of Present Illness    Patient is an 89-year-old  male who presents today for annual follow-up of history of coronary artery disease.  Since last being seen he notes overall doing well from cardiac standpoint.  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  He denies any chest pain, pressure, tightness.  Had 1 episode this past fall.  He contacted the office and was set up for stress test which was negative.  No reported syncope, presyncope, or edema.  Overall, feels well from cardiac standpoint.    The following portions of the patient's history were reviewed and updated as appropriate: allergies, current  "medications, past family history, past medical history, past social history, past surgical history and problem list.      Social History     Tobacco Use   • Smoking status: Former     Current packs/day: 0.00     Types: Cigarettes     Quit date:      Years since quittin.0     Passive exposure: Past   • Smokeless tobacco: Never   Vaping Use   • Vaping status: Never Used   Substance Use Topics   • Alcohol use: No   • Drug use: No         ROS       Objective:   /64 (BP Location: Right arm, Patient Position: Sitting)   Pulse 69   Ht 172.7 cm (68\")   Wt 73 kg (161 lb)   SpO2 97%   BMI 24.48 kg/m²         Vitals reviewed.   Constitutional:       Appearance: Well-developed and not in distress.   Neck:      Vascular: No JVD.      Trachea: No tracheal deviation.   Pulmonary:      Effort: Pulmonary effort is normal.      Breath sounds: Normal breath sounds.   Cardiovascular:      Normal rate. Regular rhythm.      Murmurs: There is no murmur.   Edema:     Peripheral edema absent.   Skin:     General: Skin is warm and dry.   Neurological:      Mental Status: Alert and oriented to person, place, and time.         Procedures          Assessment:   Assessment & Plan     Diagnoses and all orders for this visit:    1. Coronary artery disease involving native coronary artery of native heart without angina pectoris (Primary), stable.  Normal MPS in October.  On aspirin.    2. Essential hypertension, well-controlled.  On ACE inhibitor and HCTZ.    3. Mixed hyperlipidemia, stable.  Recent labs performed through [facility] shows LDL of 72.  On statin.      Plan:  Patient is overall stable from cardiac standpoint.  Continue current cardiac medications.  Follow-up in 1 years time or sooner if needed.       Jesusita GARCIA             Dictated utilizing Dragon dictation  "

## 2025-03-04 RX ORDER — RAMIPRIL 5 MG/1
5 CAPSULE ORAL DAILY
Qty: 90 CAPSULE | Refills: 3 | Status: SHIPPED | OUTPATIENT
Start: 2025-03-04

## 2025-05-02 RX ORDER — METOPROLOL SUCCINATE 25 MG/1
25 TABLET, EXTENDED RELEASE ORAL DAILY
Qty: 90 TABLET | Refills: 1 | Status: SHIPPED | OUTPATIENT
Start: 2025-05-02

## 2025-05-02 RX ORDER — SIMVASTATIN 40 MG
40 TABLET ORAL
Qty: 90 TABLET | Refills: 1 | Status: SHIPPED | OUTPATIENT
Start: 2025-05-02

## 2025-05-02 RX ORDER — HYDROCHLOROTHIAZIDE 25 MG/1
25 TABLET ORAL DAILY
Qty: 90 TABLET | Refills: 1 | Status: SHIPPED | OUTPATIENT
Start: 2025-05-02

## 2025-05-02 NOTE — TELEPHONE ENCOUNTER
COMPREHENSIVE METABOLIC PANEL  Order: [id number]  Component  Ref Range & Units 5 mo ago   Glucose  74 - 99 mg/dL 106 High    BUN  8 - 23 mg/dL 9   Creatinine  0.70 - 1.20 mg/dL 0.87   BUN/Creatinine Ratio 10   Sodium  136 - 145 mmol/L 135 Low    Potassium  3.6 - 4.9 mmol/L 4.4   Chloride  97 - 107 mmol/L 99   Total CO2  22 - 29 mmol/L 30 High    Anion Gap  6 - 16 mmol/L 6   Calcium  8.9 - 10.2 mg/dL 9.4   Total Protein  6.3 - 7.9 g/dL 7.4   Albumin  3.5 - 5.2 g/dL 4.2   AST (SGOT)  10 - 50 U/L 29   ALT (SGPT)  10 - 50 U/L 29   Alkaline Phosphatase  40 - 115 U/L 81   Total Bilirubin  0.2 - 1.1 mg/dL 0.7   eGFRcr  mL/min/1.73m*2 83.5